# Patient Record
Sex: MALE | Race: AMERICAN INDIAN OR ALASKA NATIVE
[De-identification: names, ages, dates, MRNs, and addresses within clinical notes are randomized per-mention and may not be internally consistent; named-entity substitution may affect disease eponyms.]

---

## 2021-08-08 ENCOUNTER — HOSPITAL ENCOUNTER (EMERGENCY)
Age: 41
Discharge: HOME | End: 2021-08-08
Payer: COMMERCIAL

## 2021-08-08 VITALS
RESPIRATION RATE: 24 BRPM | OXYGEN SATURATION: 97 % | HEART RATE: 76 BPM | SYSTOLIC BLOOD PRESSURE: 177 MMHG | DIASTOLIC BLOOD PRESSURE: 118 MMHG

## 2021-08-08 VITALS
SYSTOLIC BLOOD PRESSURE: 178 MMHG | OXYGEN SATURATION: 98 % | RESPIRATION RATE: 24 BRPM | DIASTOLIC BLOOD PRESSURE: 111 MMHG | HEART RATE: 71 BPM

## 2021-08-08 VITALS
HEART RATE: 81 BPM | TEMPERATURE: 98.78 F | OXYGEN SATURATION: 99 % | DIASTOLIC BLOOD PRESSURE: 126 MMHG | RESPIRATION RATE: 18 BRPM | SYSTOLIC BLOOD PRESSURE: 194 MMHG

## 2021-08-08 VITALS
SYSTOLIC BLOOD PRESSURE: 179 MMHG | DIASTOLIC BLOOD PRESSURE: 120 MMHG | OXYGEN SATURATION: 98 % | HEART RATE: 75 BPM | RESPIRATION RATE: 24 BRPM

## 2021-08-08 VITALS
OXYGEN SATURATION: 95 % | DIASTOLIC BLOOD PRESSURE: 123 MMHG | SYSTOLIC BLOOD PRESSURE: 185 MMHG | HEART RATE: 77 BPM | RESPIRATION RATE: 23 BRPM

## 2021-08-08 VITALS
SYSTOLIC BLOOD PRESSURE: 172 MMHG | RESPIRATION RATE: 20 BRPM | DIASTOLIC BLOOD PRESSURE: 120 MMHG | HEART RATE: 77 BPM | OXYGEN SATURATION: 97 %

## 2021-08-08 VITALS — OXYGEN SATURATION: 97 % | HEART RATE: 74 BPM | RESPIRATION RATE: 18 BRPM

## 2021-08-08 VITALS — BODY MASS INDEX: 26.9 KG/M2

## 2021-08-08 DIAGNOSIS — I10: Primary | ICD-10-CM

## 2021-08-08 LAB
ADD MANUAL DIFF / SLIDE REVIEW: NO
ALBUMIN SERPL-MCNC: 4.1 G/DL (ref 3.5–5)
ALBUMIN/GLOB SERPL: 1.5 {RATIO} (ref 1–2.8)
ALP SERPL-CCNC: 62 U/L (ref 38–126)
ALT SERPL-CCNC: 15 IU/L (ref ?–50)
BUN SERPL-MCNC: 17 MG/DL (ref 9–20)
CALCIUM SERPL-MCNC: 9.7 MG/DL (ref 8.4–10.2)
CHLORIDE SERPL-SCNC: 109 MMOL/L (ref 98–107)
CK SERPL-CCNC: 152 U/L (ref 55–170)
CKMB % RELATIVE INDEX: 1 % (ref 1.5–5)
CO2 SERPL-SCNC: 24 MMOL/L (ref 22–32)
ESTIMATED GLOMERULAR FILT RATE: > 60 ML/MIN (ref 60–?)
GLOBULIN SER CALC-MCNC: 2.7 G/DL (ref 1.7–4.1)
GLUCOSE SERPL-MCNC: 118 MG/DL (ref 70–100)
HEMATOCRIT: 39.1 % (ref 41–53)
HEMOGLOBIN: 13.7 G/DL (ref 13.5–17.5)
HEMOLYSIS: < 15 (ref 0–50)
LIPASE SERPL-CCNC: 65 U/L (ref 23–300)
LYMPHOCYTES # SPEC AUTO: 1400 /UL (ref 1100–4500)
MCV RBC: 87 FL (ref 80–100)
MEAN CORPUSCULAR HEMOGLOBIN: 30.5 PG (ref 26–34)
MEAN CORPUSCULAR HGB CONC: 35.1 % (ref 30–36)
PLATELET COUNT: 242 X10^3/UL (ref 150–400)
POTASSIUM SERPL-SCNC: 3.7 MMOL/L (ref 3.4–5.1)
PROT SERPL-MCNC: 6.8 G/DL (ref 6.3–8.2)
SODIUM SERPL-SCNC: 140 MMOL/L (ref 137–145)
TROPONIN I SERPL-MCNC: < 0.012 NG/ML (ref 0.01–0.03)
TROPONIN I SERPL-MCNC: < 0.012 NG/ML (ref 0.01–0.03)

## 2021-08-08 PROCEDURE — 80053 COMPREHEN METABOLIC PANEL: CPT

## 2021-08-08 PROCEDURE — 36415 COLL VENOUS BLD VENIPUNCTURE: CPT

## 2021-08-08 PROCEDURE — 99284 EMERGENCY DEPT VISIT MOD MDM: CPT

## 2021-08-08 PROCEDURE — 85025 COMPLETE CBC W/AUTO DIFF WBC: CPT

## 2021-08-08 PROCEDURE — 71045 X-RAY EXAM CHEST 1 VIEW: CPT

## 2021-08-08 PROCEDURE — 93005 ELECTROCARDIOGRAM TRACING: CPT

## 2021-08-08 PROCEDURE — 82553 CREATINE MB FRACTION: CPT

## 2021-08-08 PROCEDURE — 83690 ASSAY OF LIPASE: CPT

## 2021-08-08 PROCEDURE — 84484 ASSAY OF TROPONIN QUANT: CPT

## 2021-08-08 PROCEDURE — 82550 ASSAY OF CK (CPK): CPT

## 2021-08-23 ENCOUNTER — HOSPITAL ENCOUNTER (OUTPATIENT)
Age: 41
End: 2021-08-23
Payer: COMMERCIAL

## 2021-08-23 DIAGNOSIS — I35.1: Primary | ICD-10-CM

## 2021-08-23 DIAGNOSIS — I10: ICD-10-CM

## 2021-08-23 DIAGNOSIS — Z82.49: ICD-10-CM

## 2021-08-23 DIAGNOSIS — Z13.5: ICD-10-CM

## 2021-08-23 DIAGNOSIS — R51.9: ICD-10-CM

## 2021-08-23 DIAGNOSIS — I77.810: ICD-10-CM

## 2021-08-23 PROCEDURE — 70200 X-RAY EXAM OF EYE SOCKETS: CPT

## 2021-08-23 PROCEDURE — 70548 MR ANGIOGRAPHY NECK W/DYE: CPT

## 2021-08-23 PROCEDURE — 70544 MR ANGIOGRAPHY HEAD W/O DYE: CPT

## 2021-08-23 PROCEDURE — 93306 TTE W/DOPPLER COMPLETE: CPT

## 2021-08-23 PROCEDURE — A9579 GAD-BASE MR CONTRAST NOS,1ML: HCPCS

## 2021-08-28 ENCOUNTER — HOSPITAL ENCOUNTER (OUTPATIENT)
Dept: HOSPITAL 73 - ED | Age: 41
Setting detail: OBSERVATION
LOS: 1 days | Discharge: HOME | End: 2021-08-29
Payer: COMMERCIAL

## 2021-08-28 VITALS — BODY MASS INDEX: 24.3 KG/M2 | BODY MASS INDEX: 24.2 KG/M2

## 2021-08-28 DIAGNOSIS — Q25.43: ICD-10-CM

## 2021-08-28 DIAGNOSIS — R07.9: Primary | ICD-10-CM

## 2021-08-28 DIAGNOSIS — Z20.822: ICD-10-CM

## 2021-08-28 DIAGNOSIS — I10: ICD-10-CM

## 2021-08-28 PROCEDURE — 80061 LIPID PANEL: CPT

## 2021-08-28 PROCEDURE — 83735 ASSAY OF MAGNESIUM: CPT

## 2021-08-28 PROCEDURE — 87635 SARS-COV-2 COVID-19 AMP PRB: CPT

## 2021-08-28 PROCEDURE — 83880 ASSAY OF NATRIURETIC PEPTIDE: CPT

## 2021-08-28 PROCEDURE — 82553 CREATINE MB FRACTION: CPT

## 2021-08-28 PROCEDURE — 96374 THER/PROPH/DIAG INJ IV PUSH: CPT

## 2021-08-28 PROCEDURE — 74174 CTA ABD&PLVS W/CONTRAST: CPT

## 2021-08-28 PROCEDURE — 85610 PROTHROMBIN TIME: CPT

## 2021-08-28 PROCEDURE — 93005 ELECTROCARDIOGRAM TRACING: CPT

## 2021-08-28 PROCEDURE — 85025 COMPLETE CBC W/AUTO DIFF WBC: CPT

## 2021-08-28 PROCEDURE — G0378 HOSPITAL OBSERVATION PER HR: HCPCS

## 2021-08-28 PROCEDURE — 99285 EMERGENCY DEPT VISIT HI MDM: CPT

## 2021-08-28 PROCEDURE — 82550 ASSAY OF CK (CPK): CPT

## 2021-08-28 PROCEDURE — 71045 X-RAY EXAM CHEST 1 VIEW: CPT

## 2021-08-28 PROCEDURE — 83690 ASSAY OF LIPASE: CPT

## 2021-08-28 PROCEDURE — 85730 THROMBOPLASTIN TIME PARTIAL: CPT

## 2021-08-28 PROCEDURE — 80053 COMPREHEN METABOLIC PANEL: CPT

## 2021-08-28 PROCEDURE — 36415 COLL VENOUS BLD VENIPUNCTURE: CPT

## 2021-08-28 PROCEDURE — 96361 HYDRATE IV INFUSION ADD-ON: CPT

## 2021-08-28 PROCEDURE — 84484 ASSAY OF TROPONIN QUANT: CPT

## 2021-08-28 PROCEDURE — 71275 CT ANGIOGRAPHY CHEST: CPT

## 2021-08-29 VITALS
SYSTOLIC BLOOD PRESSURE: 131 MMHG | DIASTOLIC BLOOD PRESSURE: 87 MMHG | HEART RATE: 65 BPM | RESPIRATION RATE: 16 BRPM | OXYGEN SATURATION: 97 %

## 2021-08-29 VITALS
OXYGEN SATURATION: 98 % | HEART RATE: 68 BPM | SYSTOLIC BLOOD PRESSURE: 143 MMHG | DIASTOLIC BLOOD PRESSURE: 88 MMHG | RESPIRATION RATE: 14 BRPM

## 2021-08-29 VITALS
RESPIRATION RATE: 16 BRPM | HEART RATE: 62 BPM | OXYGEN SATURATION: 97 % | SYSTOLIC BLOOD PRESSURE: 141 MMHG | DIASTOLIC BLOOD PRESSURE: 92 MMHG | TEMPERATURE: 96.98 F

## 2021-08-29 VITALS
HEART RATE: 59 BPM | RESPIRATION RATE: 14 BRPM | DIASTOLIC BLOOD PRESSURE: 86 MMHG | OXYGEN SATURATION: 98 % | SYSTOLIC BLOOD PRESSURE: 129 MMHG

## 2021-08-29 VITALS
SYSTOLIC BLOOD PRESSURE: 148 MMHG | DIASTOLIC BLOOD PRESSURE: 89 MMHG | HEART RATE: 74 BPM | RESPIRATION RATE: 16 BRPM | OXYGEN SATURATION: 97 %

## 2021-08-29 VITALS
TEMPERATURE: 98.06 F | HEART RATE: 63 BPM | DIASTOLIC BLOOD PRESSURE: 85 MMHG | SYSTOLIC BLOOD PRESSURE: 140 MMHG | OXYGEN SATURATION: 100 % | RESPIRATION RATE: 18 BRPM

## 2021-08-29 VITALS — OXYGEN SATURATION: 97 %

## 2021-08-29 VITALS
HEART RATE: 64 BPM | DIASTOLIC BLOOD PRESSURE: 85 MMHG | OXYGEN SATURATION: 98 % | SYSTOLIC BLOOD PRESSURE: 139 MMHG | RESPIRATION RATE: 14 BRPM

## 2021-08-29 VITALS
HEART RATE: 65 BPM | DIASTOLIC BLOOD PRESSURE: 85 MMHG | OXYGEN SATURATION: 97 % | SYSTOLIC BLOOD PRESSURE: 131 MMHG | RESPIRATION RATE: 16 BRPM

## 2021-08-29 VITALS — HEART RATE: 66 BPM | DIASTOLIC BLOOD PRESSURE: 103 MMHG | SYSTOLIC BLOOD PRESSURE: 150 MMHG

## 2021-08-29 VITALS
RESPIRATION RATE: 16 BRPM | SYSTOLIC BLOOD PRESSURE: 138 MMHG | HEART RATE: 63 BPM | DIASTOLIC BLOOD PRESSURE: 90 MMHG | OXYGEN SATURATION: 97 %

## 2021-08-29 VITALS
DIASTOLIC BLOOD PRESSURE: 102 MMHG | RESPIRATION RATE: 16 BRPM | HEART RATE: 68 BPM | OXYGEN SATURATION: 99 % | TEMPERATURE: 96.98 F | SYSTOLIC BLOOD PRESSURE: 162 MMHG

## 2021-08-29 VITALS — SYSTOLIC BLOOD PRESSURE: 130 MMHG | DIASTOLIC BLOOD PRESSURE: 85 MMHG | HEART RATE: 65 BPM

## 2021-08-29 VITALS
OXYGEN SATURATION: 96 % | HEART RATE: 66 BPM | DIASTOLIC BLOOD PRESSURE: 80 MMHG | SYSTOLIC BLOOD PRESSURE: 130 MMHG | RESPIRATION RATE: 17 BRPM

## 2021-08-29 VITALS — SYSTOLIC BLOOD PRESSURE: 124 MMHG | DIASTOLIC BLOOD PRESSURE: 82 MMHG | HEART RATE: 64 BPM

## 2021-08-29 VITALS
HEART RATE: 64 BPM | SYSTOLIC BLOOD PRESSURE: 122 MMHG | OXYGEN SATURATION: 98 % | DIASTOLIC BLOOD PRESSURE: 80 MMHG | RESPIRATION RATE: 15 BRPM

## 2021-08-29 VITALS — SYSTOLIC BLOOD PRESSURE: 150 MMHG | HEART RATE: 66 BPM | DIASTOLIC BLOOD PRESSURE: 104 MMHG | OXYGEN SATURATION: 98 %

## 2021-08-29 VITALS
SYSTOLIC BLOOD PRESSURE: 140 MMHG | DIASTOLIC BLOOD PRESSURE: 92 MMHG | HEART RATE: 65 BPM | RESPIRATION RATE: 18 BRPM | OXYGEN SATURATION: 98 %

## 2021-08-29 VITALS
OXYGEN SATURATION: 97 % | RESPIRATION RATE: 16 BRPM | SYSTOLIC BLOOD PRESSURE: 130 MMHG | DIASTOLIC BLOOD PRESSURE: 86 MMHG | HEART RATE: 62 BPM

## 2021-08-29 VITALS
DIASTOLIC BLOOD PRESSURE: 85 MMHG | SYSTOLIC BLOOD PRESSURE: 132 MMHG | RESPIRATION RATE: 16 BRPM | HEART RATE: 64 BPM | OXYGEN SATURATION: 98 %

## 2021-08-29 VITALS
DIASTOLIC BLOOD PRESSURE: 84 MMHG | HEART RATE: 67 BPM | OXYGEN SATURATION: 96 % | RESPIRATION RATE: 13 BRPM | SYSTOLIC BLOOD PRESSURE: 130 MMHG

## 2021-08-29 VITALS
RESPIRATION RATE: 14 BRPM | SYSTOLIC BLOOD PRESSURE: 118 MMHG | OXYGEN SATURATION: 96 % | HEART RATE: 61 BPM | DIASTOLIC BLOOD PRESSURE: 80 MMHG

## 2021-08-29 VITALS
DIASTOLIC BLOOD PRESSURE: 94 MMHG | SYSTOLIC BLOOD PRESSURE: 144 MMHG | RESPIRATION RATE: 16 BRPM | OXYGEN SATURATION: 98 % | HEART RATE: 64 BPM

## 2021-08-29 VITALS
DIASTOLIC BLOOD PRESSURE: 90 MMHG | OXYGEN SATURATION: 98 % | RESPIRATION RATE: 14 BRPM | HEART RATE: 67 BPM | SYSTOLIC BLOOD PRESSURE: 146 MMHG

## 2021-08-29 VITALS
HEART RATE: 62 BPM | SYSTOLIC BLOOD PRESSURE: 138 MMHG | OXYGEN SATURATION: 98 % | DIASTOLIC BLOOD PRESSURE: 89 MMHG | RESPIRATION RATE: 13 BRPM

## 2021-08-29 VITALS — OXYGEN SATURATION: 98 % | HEART RATE: 61 BPM

## 2021-08-29 VITALS
SYSTOLIC BLOOD PRESSURE: 161 MMHG | DIASTOLIC BLOOD PRESSURE: 90 MMHG | RESPIRATION RATE: 18 BRPM | OXYGEN SATURATION: 98 % | TEMPERATURE: 97 F | HEART RATE: 67 BPM

## 2021-08-29 VITALS
RESPIRATION RATE: 18 BRPM | SYSTOLIC BLOOD PRESSURE: 135 MMHG | OXYGEN SATURATION: 97 % | DIASTOLIC BLOOD PRESSURE: 84 MMHG | HEART RATE: 64 BPM

## 2021-08-29 VITALS
SYSTOLIC BLOOD PRESSURE: 129 MMHG | RESPIRATION RATE: 17 BRPM | DIASTOLIC BLOOD PRESSURE: 83 MMHG | OXYGEN SATURATION: 97 % | HEART RATE: 62 BPM

## 2021-08-29 VITALS
RESPIRATION RATE: 10 BRPM | DIASTOLIC BLOOD PRESSURE: 89 MMHG | OXYGEN SATURATION: 97 % | SYSTOLIC BLOOD PRESSURE: 130 MMHG | HEART RATE: 63 BPM

## 2021-08-29 VITALS
SYSTOLIC BLOOD PRESSURE: 147 MMHG | OXYGEN SATURATION: 96 % | DIASTOLIC BLOOD PRESSURE: 104 MMHG | RESPIRATION RATE: 22 BRPM | HEART RATE: 71 BPM

## 2021-08-29 VITALS — RESPIRATION RATE: 17 BRPM | TEMPERATURE: 97.2 F

## 2021-08-29 VITALS
SYSTOLIC BLOOD PRESSURE: 136 MMHG | RESPIRATION RATE: 11 BRPM | HEART RATE: 62 BPM | DIASTOLIC BLOOD PRESSURE: 89 MMHG | OXYGEN SATURATION: 97 %

## 2021-08-29 VITALS — RESPIRATION RATE: 24 BRPM | OXYGEN SATURATION: 97 % | HEART RATE: 71 BPM

## 2021-08-29 VITALS — DIASTOLIC BLOOD PRESSURE: 72 MMHG | HEART RATE: 71 BPM | SYSTOLIC BLOOD PRESSURE: 126 MMHG

## 2021-08-29 VITALS
RESPIRATION RATE: 17 BRPM | DIASTOLIC BLOOD PRESSURE: 90 MMHG | HEART RATE: 69 BPM | OXYGEN SATURATION: 98 % | SYSTOLIC BLOOD PRESSURE: 140 MMHG

## 2021-08-29 VITALS
OXYGEN SATURATION: 96 % | HEART RATE: 65 BPM | DIASTOLIC BLOOD PRESSURE: 85 MMHG | SYSTOLIC BLOOD PRESSURE: 128 MMHG | RESPIRATION RATE: 17 BRPM

## 2021-08-29 VITALS
HEART RATE: 63 BPM | DIASTOLIC BLOOD PRESSURE: 80 MMHG | OXYGEN SATURATION: 97 % | SYSTOLIC BLOOD PRESSURE: 124 MMHG | RESPIRATION RATE: 19 BRPM

## 2021-08-29 VITALS
DIASTOLIC BLOOD PRESSURE: 86 MMHG | OXYGEN SATURATION: 97 % | RESPIRATION RATE: 19 BRPM | SYSTOLIC BLOOD PRESSURE: 132 MMHG | HEART RATE: 63 BPM

## 2021-08-29 VITALS
SYSTOLIC BLOOD PRESSURE: 145 MMHG | DIASTOLIC BLOOD PRESSURE: 95 MMHG | RESPIRATION RATE: 15 BRPM | HEART RATE: 72 BPM | OXYGEN SATURATION: 94 %

## 2021-08-29 VITALS
SYSTOLIC BLOOD PRESSURE: 130 MMHG | DIASTOLIC BLOOD PRESSURE: 88 MMHG | OXYGEN SATURATION: 98 % | HEART RATE: 61 BPM | RESPIRATION RATE: 15 BRPM

## 2021-08-29 VITALS — HEART RATE: 67 BPM | DIASTOLIC BLOOD PRESSURE: 100 MMHG | SYSTOLIC BLOOD PRESSURE: 146 MMHG

## 2021-08-29 LAB
ADD MANUAL DIFF / SLIDE REVIEW: NO
ALBUMIN SERPL-MCNC: 4.2 G/DL (ref 3.5–5)
ALBUMIN/GLOB SERPL: 1.4 {RATIO} (ref 1–2.8)
ALP SERPL-CCNC: 67 U/L (ref 38–126)
ALT SERPL-CCNC: 18 IU/L (ref ?–50)
BUN SERPL-MCNC: 24 MG/DL (ref 9–20)
CALCIUM SERPL-MCNC: 9.3 MG/DL (ref 8.4–10.2)
CHLORIDE SERPL-SCNC: 105 MMOL/L (ref 98–107)
CHOLEST SERPL-MCNC: 146 MG/DL (ref 140–199)
CK SERPL-CCNC: 111 U/L (ref 55–170)
CK SERPL-CCNC: 139 U/L (ref 55–170)
CKMB % RELATIVE INDEX: 0.8 % (ref 1.5–5)
CKMB % RELATIVE INDEX: 0.8 % (ref 1.5–5)
CO2 SERPL-SCNC: 24 MMOL/L (ref 22–32)
ESTIMATED GLOMERULAR FILT RATE: > 60 ML/MIN (ref 60–?)
GLOBULIN SER CALC-MCNC: 2.9 G/DL (ref 1.7–4.1)
GLUCOSE SERPL-MCNC: 81 MG/DL (ref 70–100)
HDLC SERPL-MCNC: 47 MG/DL (ref 40–60)
HEMATOCRIT: 42.3 % (ref 41–53)
HEMOGLOBIN: 14.2 G/DL (ref 13.5–17.5)
HEMOLYSIS: < 15 (ref 0–50)
INR PPP: 1.1 (ref 0.9–1.3)
LIPASE SERPL-CCNC: 77 U/L (ref 23–300)
LYMPHOCYTES # SPEC AUTO: 2900 /UL (ref 1100–4500)
MAGNESIUM SERPL-MCNC: 2.2 MG/DL (ref 1.6–2.3)
MCV RBC: 87 FL (ref 80–100)
MEAN CORPUSCULAR HEMOGLOBIN: 29.3 PG (ref 26–34)
MEAN CORPUSCULAR HGB CONC: 33.6 % (ref 30–36)
NT-PROBNP SERPL-MCNC: 53 PG/ML (ref ?–125)
PLATELET COUNT: 298 X10^3/UL (ref 150–400)
POTASSIUM SERPL-SCNC: 3.8 MMOL/L (ref 3.4–5.1)
PROT SERPL-MCNC: 7.1 G/DL (ref 6.3–8.2)
PROTHROMBIN TIME: 11.9 SECONDS (ref 10.1–12.7)
PTT PARTIAL THROMBOPLASTIN TIM: 35 SECONDS (ref 26.4–36.2)
SODIUM SERPL-SCNC: 138 MMOL/L (ref 137–145)
TRIGL SERPL-MCNC: 194 MG/DL (ref 35–150)
TROPONIN I SERPL-MCNC: < 0.012 NG/ML (ref 0.01–0.03)

## 2021-08-29 RX ADMIN — ASPIRIN 324 MG: 81 TABLET, CHEWABLE ORAL at 00:30

## 2021-08-29 RX ADMIN — NITROGLYCERIN 0.4 MG: 0.4 TABLET SUBLINGUAL at 00:30

## 2021-08-29 RX ADMIN — METOPROLOL TARTRATE 25 MG: 25 TABLET, FILM COATED ORAL at 12:01

## 2021-08-29 RX ADMIN — SODIUM CHLORIDE 150 ML: 0.9 INJECTION, SOLUTION INTRAVENOUS at 00:30

## 2021-08-29 RX ADMIN — METOPROLOL TARTRATE 5 MG: 5 INJECTION, SOLUTION INTRAVENOUS at 14:02

## 2021-08-29 RX ADMIN — METOPROLOL SUCCINATE 25 MG: 25 TABLET, EXTENDED RELEASE ORAL at 08:04

## 2021-08-29 RX ADMIN — NITROGLYCERIN 0.4 MG: 0.4 TABLET SUBLINGUAL at 00:37

## 2021-08-29 RX ADMIN — LOSARTAN POTASSIUM 100 MG: 50 TABLET, FILM COATED ORAL at 08:04

## 2021-08-29 RX ADMIN — Medication 10 ML: at 12:02

## 2021-09-25 ENCOUNTER — HOSPITAL ENCOUNTER (EMERGENCY)
Age: 41
Discharge: HOME | End: 2021-09-25
Payer: COMMERCIAL

## 2021-09-25 VITALS — RESPIRATION RATE: 21 BRPM | HEART RATE: 76 BPM | DIASTOLIC BLOOD PRESSURE: 68 MMHG | SYSTOLIC BLOOD PRESSURE: 103 MMHG

## 2021-09-25 VITALS
DIASTOLIC BLOOD PRESSURE: 84 MMHG | SYSTOLIC BLOOD PRESSURE: 135 MMHG | RESPIRATION RATE: 15 BRPM | OXYGEN SATURATION: 97 % | HEART RATE: 93 BPM

## 2021-09-25 VITALS — HEART RATE: 75 BPM | RESPIRATION RATE: 19 BRPM

## 2021-09-25 VITALS
HEART RATE: 85 BPM | DIASTOLIC BLOOD PRESSURE: 84 MMHG | TEMPERATURE: 98.42 F | RESPIRATION RATE: 22 BRPM | OXYGEN SATURATION: 100 % | SYSTOLIC BLOOD PRESSURE: 135 MMHG

## 2021-09-25 VITALS
DIASTOLIC BLOOD PRESSURE: 59 MMHG | SYSTOLIC BLOOD PRESSURE: 113 MMHG | OXYGEN SATURATION: 96 % | HEART RATE: 86 BPM | RESPIRATION RATE: 22 BRPM

## 2021-09-25 VITALS — HEART RATE: 92 BPM | RESPIRATION RATE: 19 BRPM

## 2021-09-25 VITALS — RESPIRATION RATE: 23 BRPM | HEART RATE: 82 BPM | OXYGEN SATURATION: 97 %

## 2021-09-25 VITALS
RESPIRATION RATE: 25 BRPM | HEART RATE: 91 BPM | DIASTOLIC BLOOD PRESSURE: 80 MMHG | OXYGEN SATURATION: 98 % | SYSTOLIC BLOOD PRESSURE: 146 MMHG

## 2021-09-25 VITALS — OXYGEN SATURATION: 97 % | RESPIRATION RATE: 22 BRPM | HEART RATE: 80 BPM

## 2021-09-25 VITALS — HEART RATE: 76 BPM | RESPIRATION RATE: 19 BRPM | DIASTOLIC BLOOD PRESSURE: 56 MMHG | SYSTOLIC BLOOD PRESSURE: 95 MMHG

## 2021-09-25 VITALS — HEART RATE: 78 BPM | DIASTOLIC BLOOD PRESSURE: 59 MMHG | RESPIRATION RATE: 22 BRPM | SYSTOLIC BLOOD PRESSURE: 104 MMHG

## 2021-09-25 VITALS
RESPIRATION RATE: 26 BRPM | DIASTOLIC BLOOD PRESSURE: 84 MMHG | SYSTOLIC BLOOD PRESSURE: 138 MMHG | HEART RATE: 92 BPM | OXYGEN SATURATION: 97 %

## 2021-09-25 VITALS — RESPIRATION RATE: 22 BRPM | HEART RATE: 79 BPM | DIASTOLIC BLOOD PRESSURE: 62 MMHG | SYSTOLIC BLOOD PRESSURE: 104 MMHG

## 2021-09-25 VITALS
OXYGEN SATURATION: 97 % | SYSTOLIC BLOOD PRESSURE: 115 MMHG | DIASTOLIC BLOOD PRESSURE: 61 MMHG | RESPIRATION RATE: 24 BRPM | HEART RATE: 81 BPM

## 2021-09-25 VITALS — RESPIRATION RATE: 23 BRPM | HEART RATE: 79 BPM | OXYGEN SATURATION: 96 %

## 2021-09-25 VITALS
HEART RATE: 89 BPM | OXYGEN SATURATION: 97 % | SYSTOLIC BLOOD PRESSURE: 156 MMHG | RESPIRATION RATE: 24 BRPM | DIASTOLIC BLOOD PRESSURE: 64 MMHG

## 2021-09-25 VITALS — HEART RATE: 85 BPM | RESPIRATION RATE: 33 BRPM | OXYGEN SATURATION: 97 %

## 2021-09-25 VITALS — RESPIRATION RATE: 19 BRPM | HEART RATE: 76 BPM | DIASTOLIC BLOOD PRESSURE: 54 MMHG | SYSTOLIC BLOOD PRESSURE: 88 MMHG

## 2021-09-25 VITALS
DIASTOLIC BLOOD PRESSURE: 55 MMHG | SYSTOLIC BLOOD PRESSURE: 108 MMHG | RESPIRATION RATE: 24 BRPM | HEART RATE: 81 BPM | OXYGEN SATURATION: 96 %

## 2021-09-25 VITALS — DIASTOLIC BLOOD PRESSURE: 62 MMHG | SYSTOLIC BLOOD PRESSURE: 104 MMHG | RESPIRATION RATE: 22 BRPM | HEART RATE: 80 BPM

## 2021-09-25 VITALS — RESPIRATION RATE: 26 BRPM | HEART RATE: 93 BPM | OXYGEN SATURATION: 97 %

## 2021-09-25 VITALS — RESPIRATION RATE: 24 BRPM | DIASTOLIC BLOOD PRESSURE: 56 MMHG | SYSTOLIC BLOOD PRESSURE: 103 MMHG | HEART RATE: 77 BPM

## 2021-09-25 VITALS — RESPIRATION RATE: 22 BRPM | HEART RATE: 81 BPM | OXYGEN SATURATION: 96 %

## 2021-09-25 VITALS — OXYGEN SATURATION: 97 %

## 2021-09-25 VITALS
OXYGEN SATURATION: 97 % | HEART RATE: 81 BPM | DIASTOLIC BLOOD PRESSURE: 59 MMHG | RESPIRATION RATE: 22 BRPM | SYSTOLIC BLOOD PRESSURE: 111 MMHG

## 2021-09-25 VITALS — OXYGEN SATURATION: 97 % | RESPIRATION RATE: 30 BRPM | HEART RATE: 85 BPM

## 2021-09-25 VITALS — OXYGEN SATURATION: 97 % | HEART RATE: 96 BPM

## 2021-09-25 VITALS — OXYGEN SATURATION: 97 % | RESPIRATION RATE: 25 BRPM | HEART RATE: 87 BPM

## 2021-09-25 VITALS
DIASTOLIC BLOOD PRESSURE: 62 MMHG | SYSTOLIC BLOOD PRESSURE: 126 MMHG | OXYGEN SATURATION: 97 % | HEART RATE: 85 BPM | RESPIRATION RATE: 27 BRPM

## 2021-09-25 VITALS — RESPIRATION RATE: 26 BRPM | HEART RATE: 84 BPM | OXYGEN SATURATION: 100 %

## 2021-09-25 VITALS — BODY MASS INDEX: 24.2 KG/M2 | BODY MASS INDEX: 53.5 KG/M2

## 2021-09-25 DIAGNOSIS — R06.00: ICD-10-CM

## 2021-09-25 DIAGNOSIS — Z20.822: ICD-10-CM

## 2021-09-25 DIAGNOSIS — R07.89: Primary | ICD-10-CM

## 2021-09-25 DIAGNOSIS — I77.89: ICD-10-CM

## 2021-09-25 LAB
ADD MANUAL DIFF / SLIDE REVIEW: NO
ALBUMIN SERPL-MCNC: 4.4 G/DL (ref 3.5–5)
ALBUMIN/GLOB SERPL: 1.6 {RATIO} (ref 1–2.8)
ALP SERPL-CCNC: 63 U/L (ref 38–126)
ALT SERPL-CCNC: 20 IU/L (ref ?–50)
BUN SERPL-MCNC: 19 MG/DL (ref 9–20)
CALCIUM SERPL-MCNC: 9.4 MG/DL (ref 8.4–10.2)
CHLORIDE SERPL-SCNC: 101 MMOL/L (ref 98–107)
CO2 SERPL-SCNC: 28 MMOL/L (ref 22–32)
ESTIMATED GLOMERULAR FILT RATE: > 60 ML/MIN (ref 60–?)
GLOBULIN SER CALC-MCNC: 2.8 G/DL (ref 1.7–4.1)
GLUCOSE SERPL-MCNC: 108 MG/DL (ref 70–100)
HEMATOCRIT: 41.1 % (ref 41–53)
HEMOGLOBIN: 13.8 G/DL (ref 13.5–17.5)
HEMOLYSIS: < 15 (ref 0–50)
LIPASE SERPL-CCNC: 55 U/L (ref 23–300)
LYMPHOCYTES # SPEC AUTO: 2100 /UL (ref 1100–4500)
MAGNESIUM SERPL-MCNC: 2 MG/DL (ref 1.6–2.3)
MCV RBC: 86.7 FL (ref 80–100)
MEAN CORPUSCULAR HEMOGLOBIN: 29.2 PG (ref 26–34)
MEAN CORPUSCULAR HGB CONC: 33.6 % (ref 30–36)
PLATELET COUNT: 282 X10^3/UL (ref 150–400)
POTASSIUM SERPL-SCNC: 3.5 MMOL/L (ref 3.4–5.1)
PROT SERPL-MCNC: 7.2 G/DL (ref 6.3–8.2)
SODIUM SERPL-SCNC: 137 MMOL/L (ref 137–145)
TROPONIN I SERPL-MCNC: < 0.012 NG/ML (ref 0.01–0.03)

## 2021-09-25 PROCEDURE — 87635 SARS-COV-2 COVID-19 AMP PRB: CPT

## 2021-09-25 PROCEDURE — 74174 CTA ABD&PLVS W/CONTRAST: CPT

## 2021-09-25 PROCEDURE — 83735 ASSAY OF MAGNESIUM: CPT

## 2021-09-25 PROCEDURE — 83690 ASSAY OF LIPASE: CPT

## 2021-09-25 PROCEDURE — 36415 COLL VENOUS BLD VENIPUNCTURE: CPT

## 2021-09-25 PROCEDURE — 86900 BLOOD TYPING SEROLOGIC ABO: CPT

## 2021-09-25 PROCEDURE — 99285 EMERGENCY DEPT VISIT HI MDM: CPT

## 2021-09-25 PROCEDURE — 96374 THER/PROPH/DIAG INJ IV PUSH: CPT

## 2021-09-25 PROCEDURE — 96375 TX/PRO/DX INJ NEW DRUG ADDON: CPT

## 2021-09-25 PROCEDURE — 80053 COMPREHEN METABOLIC PANEL: CPT

## 2021-09-25 PROCEDURE — 86850 RBC ANTIBODY SCREEN: CPT

## 2021-09-25 PROCEDURE — 85025 COMPLETE CBC W/AUTO DIFF WBC: CPT

## 2021-09-25 PROCEDURE — 84484 ASSAY OF TROPONIN QUANT: CPT

## 2021-09-25 PROCEDURE — 93005 ELECTROCARDIOGRAM TRACING: CPT

## 2021-09-25 PROCEDURE — 86901 BLOOD TYPING SEROLOGIC RH(D): CPT

## 2021-09-25 PROCEDURE — 96376 TX/PRO/DX INJ SAME DRUG ADON: CPT

## 2021-09-25 PROCEDURE — 71275 CT ANGIOGRAPHY CHEST: CPT

## 2023-10-21 ENCOUNTER — HOSPITAL ENCOUNTER (OUTPATIENT)
Age: 43
End: 2023-10-21
Payer: COMMERCIAL

## 2023-10-21 VITALS — BODY MASS INDEX: 24.2 KG/M2

## 2023-10-21 DIAGNOSIS — F41.8: Primary | ICD-10-CM

## 2023-10-21 DIAGNOSIS — I77.89: ICD-10-CM

## 2023-10-21 DIAGNOSIS — I10: ICD-10-CM

## 2023-10-21 LAB
ADD MANUAL DIFF / SLIDE REVIEW: NO
ALBUMIN SERPL-MCNC: 4.4 G/DL (ref 3.5–5)
ALBUMIN/GLOB SERPL: 1.4 {RATIO} (ref 1–2.8)
ALP SERPL-CCNC: 73 U/L (ref 38–126)
ALT SERPL-CCNC: 22 IU/L (ref ?–50)
BUN SERPL-MCNC: 18 MG/DL (ref 9–20)
CALCIUM SERPL-MCNC: 9.5 MG/DL (ref 8.4–10.2)
CHLORIDE SERPL-SCNC: 107 MMOL/L (ref 98–107)
CHOLEST SERPL-MCNC: 254 MG/DL (ref 140–199)
CO2 SERPL-SCNC: 24 MMOL/L (ref 22–32)
ESTIMATED GLOMERULAR FILT RATE: > 60 ML/MIN (ref 60–?)
GLOBULIN SER CALC-MCNC: 3.2 G/DL (ref 1.7–4.1)
GLUCOSE SERPL-MCNC: 110 MG/DL (ref 70–100)
HDLC SERPL-MCNC: 59 MG/DL (ref 40–60)
HEMATOCRIT: 42.5 % (ref 41–53)
HEMOGLOBIN: 14.5 G/DL (ref 13.5–17.5)
HEMOLYSIS: 16 (ref 0–50)
LYMPHOCYTES # SPEC AUTO: 1700 /UL (ref 1100–4500)
MCV RBC: 86.9 FL (ref 80–100)
MEAN CORPUSCULAR HEMOGLOBIN: 29.7 PG (ref 26–34)
MEAN CORPUSCULAR HGB CONC: 34.2 % (ref 30–36)
MICROALBUMI CREATININ RATIO UR: 4.6 UG/MG CR (ref ?–30)
PLATELET COUNT: 321 X10^3/UL (ref 150–400)
POTASSIUM SERPL-SCNC: 4.6 MMOL/L (ref 3.4–5.1)
PROT SERPL-MCNC: 7.6 G/DL (ref 6.3–8.2)
SODIUM SERPL-SCNC: 139 MMOL/L (ref 137–145)
TRIGL SERPL-MCNC: 225 MG/DL (ref 35–150)
TSH W/ REFLEX TO FT4: 2.02 UIU/ML (ref 0.47–4.68)

## 2023-10-21 PROCEDURE — 82043 UR ALBUMIN QUANTITATIVE: CPT

## 2023-10-21 PROCEDURE — 80053 COMPREHEN METABOLIC PANEL: CPT

## 2023-10-21 PROCEDURE — 82570 ASSAY OF URINE CREATININE: CPT

## 2023-10-21 PROCEDURE — 36415 COLL VENOUS BLD VENIPUNCTURE: CPT

## 2023-10-21 PROCEDURE — 80061 LIPID PANEL: CPT

## 2023-10-21 PROCEDURE — 84443 ASSAY THYROID STIM HORMONE: CPT

## 2023-10-21 PROCEDURE — 85025 COMPLETE CBC W/AUTO DIFF WBC: CPT

## 2025-03-31 ENCOUNTER — HOSPITAL ENCOUNTER (EMERGENCY)
Age: 45
Discharge: HOME | End: 2025-03-31
Payer: COMMERCIAL

## 2025-03-31 VITALS — OXYGEN SATURATION: 96 % | HEART RATE: 67 BPM | DIASTOLIC BLOOD PRESSURE: 85 MMHG | SYSTOLIC BLOOD PRESSURE: 129 MMHG

## 2025-03-31 VITALS — OXYGEN SATURATION: 96 % | SYSTOLIC BLOOD PRESSURE: 125 MMHG | DIASTOLIC BLOOD PRESSURE: 74 MMHG | HEART RATE: 56 BPM

## 2025-03-31 VITALS
RESPIRATION RATE: 11 BRPM | OXYGEN SATURATION: 99 % | DIASTOLIC BLOOD PRESSURE: 76 MMHG | SYSTOLIC BLOOD PRESSURE: 121 MMHG | HEART RATE: 58 BPM

## 2025-03-31 VITALS — OXYGEN SATURATION: 97 % | HEART RATE: 59 BPM

## 2025-03-31 VITALS
DIASTOLIC BLOOD PRESSURE: 82 MMHG | SYSTOLIC BLOOD PRESSURE: 134 MMHG | RESPIRATION RATE: 12 BRPM | HEART RATE: 61 BPM | OXYGEN SATURATION: 99 %

## 2025-03-31 VITALS
SYSTOLIC BLOOD PRESSURE: 125 MMHG | HEART RATE: 59 BPM | RESPIRATION RATE: 16 BRPM | OXYGEN SATURATION: 98 % | DIASTOLIC BLOOD PRESSURE: 77 MMHG

## 2025-03-31 VITALS
RESPIRATION RATE: 12 BRPM | SYSTOLIC BLOOD PRESSURE: 110 MMHG | DIASTOLIC BLOOD PRESSURE: 75 MMHG | OXYGEN SATURATION: 93 % | HEART RATE: 59 BPM

## 2025-03-31 VITALS — DIASTOLIC BLOOD PRESSURE: 80 MMHG | OXYGEN SATURATION: 100 % | HEART RATE: 60 BPM | SYSTOLIC BLOOD PRESSURE: 137 MMHG

## 2025-03-31 VITALS
RESPIRATION RATE: 21 BRPM | DIASTOLIC BLOOD PRESSURE: 83 MMHG | OXYGEN SATURATION: 98 % | SYSTOLIC BLOOD PRESSURE: 135 MMHG | HEART RATE: 61 BPM

## 2025-03-31 VITALS
HEART RATE: 62 BPM | DIASTOLIC BLOOD PRESSURE: 69 MMHG | OXYGEN SATURATION: 96 % | SYSTOLIC BLOOD PRESSURE: 121 MMHG | RESPIRATION RATE: 10 BRPM

## 2025-03-31 VITALS — HEART RATE: 75 BPM | DIASTOLIC BLOOD PRESSURE: 82 MMHG | OXYGEN SATURATION: 98 % | SYSTOLIC BLOOD PRESSURE: 141 MMHG

## 2025-03-31 VITALS — SYSTOLIC BLOOD PRESSURE: 133 MMHG | DIASTOLIC BLOOD PRESSURE: 91 MMHG | HEART RATE: 72 BPM | OXYGEN SATURATION: 98 %

## 2025-03-31 VITALS
TEMPERATURE: 97.16 F | HEART RATE: 60 BPM | SYSTOLIC BLOOD PRESSURE: 144 MMHG | RESPIRATION RATE: 16 BRPM | OXYGEN SATURATION: 99 % | DIASTOLIC BLOOD PRESSURE: 88 MMHG

## 2025-03-31 VITALS
DIASTOLIC BLOOD PRESSURE: 84 MMHG | SYSTOLIC BLOOD PRESSURE: 139 MMHG | RESPIRATION RATE: 19 BRPM | OXYGEN SATURATION: 98 % | HEART RATE: 74 BPM

## 2025-03-31 VITALS — DIASTOLIC BLOOD PRESSURE: 88 MMHG | SYSTOLIC BLOOD PRESSURE: 144 MMHG

## 2025-03-31 VITALS — BODY MASS INDEX: 23.7 KG/M2 | BODY MASS INDEX: 24.2 KG/M2

## 2025-03-31 DIAGNOSIS — I10: ICD-10-CM

## 2025-03-31 DIAGNOSIS — G08: Primary | ICD-10-CM

## 2025-03-31 DIAGNOSIS — R42: ICD-10-CM

## 2025-03-31 DIAGNOSIS — Z86.73: ICD-10-CM

## 2025-03-31 DIAGNOSIS — R29.700: ICD-10-CM

## 2025-03-31 LAB
ADD MANUAL DIFF / SLIDE REVIEW: NO
ALBUMIN SERPL-MCNC: 4.3 G/DL (ref 3.5–5)
ALBUMIN/GLOB SERPL: 1.5 {RATIO} (ref 1–2.8)
ALP SERPL-CCNC: 50 U/L (ref 38–126)
ALT SERPL-CCNC: 27 IU/L (ref ?–50)
APPEARANCE UR: (no result)
BILIRUBIN URINE UA: NEGATIVE
BUN SERPL-MCNC: 15 MG/DL (ref 9–20)
CALCIUM SERPL-MCNC: 9.7 MG/DL (ref 8.4–10.2)
CHLORIDE SERPL-SCNC: 106 MMOL/L (ref 98–107)
CK SERPL-CCNC: 113 U/L (ref 55–170)
CO2 SERPL-SCNC: 21 MMOL/L (ref 22–32)
COLOR UR: YELLOW
ESTIMATED GLOMERULAR FILT RATE: > 60 ML/MIN (ref 60–?)
FLUAV RNA UPPER RESP QL NAA+PROBE: (no result)
FLUBV RNA UPPER RESP QL NAA+PROBE: (no result)
GLOBULIN SER CALC-MCNC: 2.9 G/DL (ref 1.7–4.1)
GLUCOSE SERPL-MCNC: 145 MG/DL (ref 70–100)
GLUCOSE URINE UA: NEGATIVE G/DL
HEMATOCRIT: 40 % (ref 41–53)
HEMOGLOBIN: 13.5 G/DL (ref 13.5–17.5)
HEMOLYSIS: < 15 (ref 0–50)
HGB UR QL: NEGATIVE
INR PPP: 1 (ref 0.9–1.3)
KETONES URINE UA: NEGATIVE
LEUKOCYTE ESTERASE URINE UA: NEGATIVE
LIPASE SERPL-CCNC: 37 U/L (ref 23–300)
LYMPHOCYTES # SPEC AUTO: 900 /UL (ref 1100–4500)
MCV RBC: 87 FL (ref 80–100)
MEAN CORPUSCULAR HEMOGLOBIN: 29.4 PG (ref 26–34)
MEAN CORPUSCULAR HGB CONC: 33.8 % (ref 30–36)
NITRITE URINE UA: NEGATIVE
NT-PROBNP SERPL-MCNC: 224 PG/ML (ref ?–125)
PH UR: 8 [PH] (ref 4.5–8)
PLATELET COUNT: 251 X10^3/UL (ref 150–400)
POTASSIUM SERPL-SCNC: 4.2 MMOL/L (ref 3.4–5.1)
PROCALCITONIN SERPL-MCNC: 0.04 NG/ML (ref ?–0.5)
PROT SERPL-MCNC: 7.2 G/DL (ref 6.3–8.2)
PROTEIN URINE UA: NEGATIVE
PROTHROMBIN TIME: 11.5 SECONDS (ref 9.4–12.5)
PTT PARTIAL THROMBOPLASTIN TIM: 34 SECONDS (ref 25.1–36.5)
SODIUM SERPL-SCNC: 136 MMOL/L (ref 137–145)
SP GR UR: 1.01 (ref 1–1.03)
TROPONIN I SERPL-MCNC: < 0.012 NG/ML (ref 0.01–0.03)
UROBILINOGEN UR QL: 0.2 E.U./DL

## 2025-03-31 PROCEDURE — 80053 COMPREHEN METABOLIC PANEL: CPT

## 2025-03-31 PROCEDURE — 70496 CT ANGIOGRAPHY HEAD: CPT

## 2025-03-31 PROCEDURE — 93005 ELECTROCARDIOGRAM TRACING: CPT

## 2025-03-31 PROCEDURE — 99284 EMERGENCY DEPT VISIT MOD MDM: CPT

## 2025-03-31 PROCEDURE — 83690 ASSAY OF LIPASE: CPT

## 2025-03-31 PROCEDURE — 85610 PROTHROMBIN TIME: CPT

## 2025-03-31 PROCEDURE — 83880 ASSAY OF NATRIURETIC PEPTIDE: CPT

## 2025-03-31 PROCEDURE — 84484 ASSAY OF TROPONIN QUANT: CPT

## 2025-03-31 PROCEDURE — 85730 THROMBOPLASTIN TIME PARTIAL: CPT

## 2025-03-31 PROCEDURE — 84145 PROCALCITONIN (PCT): CPT

## 2025-03-31 PROCEDURE — 71045 X-RAY EXAM CHEST 1 VIEW: CPT

## 2025-03-31 PROCEDURE — 85025 COMPLETE CBC W/AUTO DIFF WBC: CPT

## 2025-03-31 PROCEDURE — 99283 EMERGENCY DEPT VISIT LOW MDM: CPT

## 2025-03-31 PROCEDURE — 0241U: CPT

## 2025-03-31 PROCEDURE — 82550 ASSAY OF CK (CPK): CPT

## 2025-03-31 PROCEDURE — 80305 DRUG TEST PRSMV DIR OPT OBS: CPT

## 2025-03-31 PROCEDURE — 81001 URINALYSIS AUTO W/SCOPE: CPT

## 2025-03-31 PROCEDURE — 36415 COLL VENOUS BLD VENIPUNCTURE: CPT

## 2025-03-31 NOTE — ED_ITS
HPI - General Adult    
<Jamie York DO - Last Filed: 25 07:00>    
General    
Chief complaint: Chest Pain    
Stated complaint: infection in colon, confusion and hot flashes    
Time Seen by Provider: 25 04:53    
Source: patient    
Mode of arrival: Ambulatory    
History of Present Illness    
HPI narrative:     
44-year-old male past medical history of aortic root aneurysm status post   
hemiarch replacement at [facility] on 2022, stroke,   
hypertension, cerebral venous thrombosis, diverticulitis, comes into the ED from  
home for evaluation of multiple complaints.  He states that he is having random   
hot flashes as well as lightheadedness earlier this morning when he awoke.  He   
denies any actual syncopal symptoms.  Denies any actual chest pain.  Denies any   
shortness of breath fever chills nausea vomiting abdominal pain or any other   
GI/ symptoms at the time.  He states that he does feel somewhat similar to   
when he was seen here previously in which he was diagnosed with venous sinus   
thrombosis which required transfer to [facility].  On evaluation patient without  
any focal deficits NIH of 0.  He states that he is feeling the hot flashes and   
lightheadedness like he did before but states that he is not having the abnormal  
taste/smell that he was having previously.  He states that he also felt brain   
fog/confusion which has since resolved. He is also not having any actual   
abdominal pain that he had previously.    
Related Data    
                                Home Medications    
    
    
    
 Medication  Instructions  Recorded  Confirmed    
     
aspirin 325 mg tablet 325 mg PO DAILY 23    
    
    
                                  Previous Rx's    
    
    
    
 Medication  Instructions  Recorded    
     
sodium,potassium,mag sulfates 17.5 See Rx Instructions PO .COMPLEX 25    
    
gram-3.13 gram-1.6 gram oral soln #354 mL     
    
(Suprep Bowel Prep Kit)      
    
    
    
                                    Allergies    
    
    
    
Allergy/AdvReac Type Severity Reaction Status Date / Time    
     
No Known Drug Allergies Allergy   Verified 25 15:09    
    
    
    
    
Review of Systems    
<Jamie York DO - Last Filed: 25 07:00>    
Review of Systems    
Narrative:     
General:  Positive chills, hot flashes    
HEENT:  Denies headache, eye drainage, eye irritation, head trauma, sore throat,  
voice change    
Cardiovascular:  Denies any chest pain, palpitations, tachycardia    
Respiratory:  Denies any shortness of breath, cough, wheeze, stridor    
GI/:  Denies any abdominal pain, nausea, vomiting, diarrhea, bright red blood   
per rectum, melanotic stools, urinary frequency, urinary retention, dysuria,   
hematuria    
MSK:  Denies any joint pain, muscle pains, swelling    
Skin:  Denies any rashes, lesions, discoloration    
Neuro:  Positive lightheadedness, confusion/brain fog, denies headache weakness    
Psych:  Denies SI/HI    
    
Patient History    
<Jamie York DO - Last Filed: 25 07:00>    
Medical History (Updated 25 @ 06:59 by Jamie York DO)    
    
Colon cancer screening    
Essential hypertension    
Ascending aorta enlargement    
Aortic root enlargement    
Family history of brain aneurysm    
Hyperglycemia    
    
    
Surgical History (Reviewed 22 @ 08:14 by JONNY Farmer)    
    
No history of previous surgery    
    
    
Family History (Reviewed 22 @ 08:14 by JONNY Farmer)    
Mother   Hypertension    
Grandmother      Congestive heart failure    
   Stroke    
   Aortic dissection    
Father      Aortic aneurysm rupture    
   Aortic dissection    
Family/Other   Aortic dissection    
    
    
Social History (Updated 25 @ 15:38 by Sarah Miramontes MA)    
marital status:       
number of children:  3     
household members:  spouse and children     
lives independently:  Yes     
occupational status:  employed     
Smoking Status:  Former smoker     
alcohol intake:  former     
substance use type:  marijuana     
    
    
Smoking Status: Former smoker    
tobacco type: smokeless tobacco    
alcohol intake frequency: 0-2 drinks per day    
    
Exam    
<Jamie York DO - Last Filed: 25 07:00>    
Narrative    
Exam Narrative:     
General:  Cooperative, well-developed, not in acute distress    
HEENT:  Normocephalic, atraumatic, PERRLA, normal sclera, eyelids normal    
Neck:  Active full range of motion, atraumatic    
Chest:  Normal to inspection, negative crepitus, no overlying erythema   
ecchymosis    
Respiratory:  Normal respiratory effort, not in acute respiratory distress,   
clear to auscultation bilaterally negative cough, wheeze, tachypnea, rhonchi,   
rales    
Cardiology:  Regular rate rhythm negative gallop, murmur, rubs    
GI/:  No tenderness to palpation, soft, non rigid, normal to inspection,    
exam deferred    
MSK:  Full active range of motion in all 4 extremities, atraumatic, no   
tenderness to palpation of any bony prominences    
Skin:  No rashes or lesions noted    
Neuro:  NIH of 0, no focal deficits Alert awake oriented x3, moves all 4   
extremities spontaneously, cranial nerves intact, able to answer all questions   
appropriately follows commands appropriately    
Psych:  Cooperative, negative suicidal or homicidal ideations    
Initial Vital Signs    
Initial Vital Signs:     
    
Vital Signs    
    
    
    
Blood Pressure  144/88 H  25 04:49    
    
    
    
    
    
<Brynn Mcpherson, DO - Last Filed: 25 19:03>    
Initial Vital Signs    
Initial Vital Signs:     
    
Vital Signs    
    
    
    
Blood Pressure  144/88 H  25 04:49    
    
    
    
    
    
Course    
<Jamie Garciabret, DO - Last Filed: 25 07:00>    
Orders    
Ordered:     
    
                                    ED Orders    
    
25 04:57    
XR chest 1V Stat     
EKG-12 Lead Stat     
    
25 05:06    
Complete Blood Count AUTO DIFF Stat     
Comprehensive Metabolic Panel Stat     
Lipase Stat     
NT-proBNP (BNP-Adult 18+) Stat     
PTT Partial Thromboplastin Jonas Stat     
Procalcitonin Stat     
Prothrombin Time INR Stat     
Troponin & CK Cardiac Panel Stat     
    
25 05:11    
Covid-19 + FLU A/B + RSV - PCR Stat     
    
25 05:19    
CT angio head Stat     
    
25 06:40    
Urinalysis and Microscopic Stat     
Urine Drug Screen, Rapid Stat     
    
    
    
    
Vital Signs    
Vital signs:     
    
                               Vital Signs - 8 hr    
    
    
    
 25    
04:49 25    
04:50 25    
04:59    
     
Temperature   97.1 F L    
     
Pulse Rate  59 L 60    
     
Respiratory Rate   16    
     
Blood Pressure 144/88 H  144/88 H    
     
Pulse Oximetry  97 99    
     
Oxygen Delivery Method   Room Air    
    
    
    
    
 25    
05:00 25    
05:00 25    
05:09    
     
Temperature       
     
Pulse Rate 67  72    
     
Respiratory Rate       
     
Blood Pressure  129/85     
     
Pulse Oximetry 96  98    
     
Oxygen Delivery Method       
    
    
    
    
 25    
05:09 25    
05:11 25    
05:11    
     
Temperature       
     
Pulse Rate  75     
     
Respiratory Rate       
     
Blood Pressure 133/91 H  141/82 H    
     
Pulse Oximetry  98     
     
Oxygen Delivery Method       
    
    
    
    
 25    
05:30 25    
05:30 25    
05:46    
     
Temperature       
     
Pulse Rate 62  61    
     
Respiratory Rate 10 L  12    
     
Blood Pressure  121/69     
     
Pulse Oximetry 96  99    
     
Oxygen Delivery Method       
    
    
    
    
 25    
05:46 25    
06:00 25    
06:00    
     
Temperature       
     
Pulse Rate  61     
     
Respiratory Rate  21     
     
Blood Pressure 134/82  135/83    
     
Pulse Oximetry  98     
     
Oxygen Delivery Method       
    
    
    
    
 25    
06:30 25    
06:30 25    
06:41    
     
Temperature       
     
Pulse Rate  74 60    
     
Respiratory Rate  19     
     
Blood Pressure 139/84      
     
Pulse Oximetry  98 100    
     
Oxygen Delivery Method       
    
    
    
    
 25    
06:41 25    
07:00 25    
07:00    
     
Temperature       
     
Pulse Rate  59 L     
     
Respiratory Rate  12     
     
Blood Pressure 137/80  110/75    
     
Pulse Oximetry  93     
     
Oxygen Delivery Method       
    
    
    
    
 25    
07:30 25    
07:30 25    
08:00    
     
Temperature       
     
Pulse Rate 56 L      
     
Respiratory Rate       
     
Blood Pressure  125/74 125/77    
     
Pulse Oximetry 96      
     
Oxygen Delivery Method       
    
    
    
    
 25    
08:00    
     
Temperature     
     
Pulse Rate 59 L    
     
Respiratory Rate 16    
     
Blood Pressure     
     
Pulse Oximetry 98    
     
Oxygen Delivery Method     
    
    
    
    
    
<Brynn Mcpherson,  - Last Filed: 25 19:03>    
Orders    
Ordered:     
    
                                    ED Orders    
    
25 04:57    
XR chest 1V Stat     
EKG-12 Lead Stat     
    
25 05:06    
Complete Blood Count AUTO DIFF Stat     
Comprehensive Metabolic Panel Stat     
Lipase Stat     
NT-proBNP (BNP-Adult 18+) Stat     
PTT Partial Thromboplastin Jonas Stat     
Procalcitonin Stat     
Prothrombin Time INR Stat     
Troponin & CK Cardiac Panel Stat     
    
25 05:11    
Covid-19 + FLU A/B + RSV - PCR Stat     
    
25 05:19    
CT angio head Stat     
    
25 06:40    
Urinalysis and Microscopic Stat     
Urine Drug Screen, Rapid Stat     
    
    
    
    
Vital Signs    
Vital signs:     
    
                               Vital Signs - 8 hr    
    
    
    
 25    
04:49 25    
04:50 25    
04:59    
     
Temperature   97.1 F L    
     
Pulse Rate  59 L 60    
     
Respiratory Rate   16    
     
Blood Pressure 144/88 H  144/88 H    
     
Pulse Oximetry  97 99    
     
Oxygen Delivery Method   Room Air    
    
    
    
    
 25    
05:00 25    
05:00 25    
05:09    
     
Temperature       
     
Pulse Rate 67  72    
     
Respiratory Rate       
     
Blood Pressure  129/85     
     
Pulse Oximetry 96  98    
     
Oxygen Delivery Method       
    
    
    
    
 25    
05:09 25    
05:11 25    
05:11    
     
Temperature       
     
Pulse Rate  75     
     
Respiratory Rate       
     
Blood Pressure 133/91 H  141/82 H    
     
Pulse Oximetry  98     
     
Oxygen Delivery Method       
    
    
    
    
 25    
05:30 25    
05:30 25    
05:46    
     
Temperature       
     
Pulse Rate 62  61    
     
Respiratory Rate 10 L  12    
     
Blood Pressure  121/69     
     
Pulse Oximetry 96  99    
     
Oxygen Delivery Method       
    
    
    
    
 25    
05:46 25    
06:00 25    
06:00    
     
Temperature       
     
Pulse Rate  61     
     
Respiratory Rate  21     
     
Blood Pressure 134/82  135/83    
     
Pulse Oximetry  98     
     
Oxygen Delivery Method       
    
    
    
    
 25    
06:30 25    
06:30 25    
06:41    
     
Temperature       
     
Pulse Rate  74 60    
     
Respiratory Rate  19     
     
Blood Pressure 139/84      
     
Pulse Oximetry  98 100    
     
Oxygen Delivery Method       
    
    
    
    
 25    
06:41 25    
07:00 25    
07:00    
     
Temperature       
     
Pulse Rate  59 L     
     
Respiratory Rate  12     
     
Blood Pressure 137/80  110/75    
     
Pulse Oximetry  93     
     
Oxygen Delivery Method       
    
    
    
    
 25    
07:30 25    
07:30 25    
08:00    
     
Temperature       
     
Pulse Rate 56 L      
     
Respiratory Rate       
     
Blood Pressure  125/74 125/77    
     
Pulse Oximetry 96      
     
Oxygen Delivery Method       
    
    
    
    
 25    
08:00    
     
Temperature     
     
Pulse Rate 59 L    
     
Respiratory Rate 16    
     
Blood Pressure     
     
Pulse Oximetry 98    
     
Oxygen Delivery Method     
    
    
    
    
    
Medical Decision Making    
<Jamie York, DO - Last Filed: 25 07:00>    
Differential Diagnosis    
Differential Diagnosis: CVA, TIA, electrolyte abnormality, electrolyte   
abnormality, COVID, flu    
Lab Data    
    
                                                        25 05:06              
    
                                                        25 05:06              
    
Labs:     
    
                                   Lab Results    
    
    
    
  25 Range/Units    
    
  05:06 05:11 06:40     
     
WBC  6.0    (4.5-11.0)  X10^3/uL    
     
RBC  4.60    (4.5-5.9)  X10^6/uL    
     
Hgb  13.5    (13.5-17.5)  g/dL    
     
Hct  40.0 L    (41-53)  %    
     
MCV  87.0    ()  fL    
     
MCH  29.4    (26-34)  PG    
     
MCHC  33.8    (30-36)  %    
     
RDW  13.6    (11.6-14.8)  %    
     
Plt Count  251    (150-400)  X10^3/uL    
     
Neut % (Auto)  77.9 H    (50-75)  %    
     
Lymph % (Auto)  15.6 L    (25-40)  %    
     
Mono % (Auto)  3.4    (3-14)  %    
     
Eos % (Auto)  2.0    (2-4)  %    
     
Baso % (Auto)  1.1    (0-2)  %    
     
Neut # (Auto)  4700    (6677-3652)  /uL    
     
Lymph # (Auto)  900 L    (8930-8045)  /uL    
     
Mono # (Auto)  200    (0-900)  /uL    
     
Eos # (Auto)  100    (0-450)  /uL    
     
Baso # (Auto)  100    (0-100)  /uL    
     
PT  11.5    (9.4-12.5)  SECONDS    
     
INR  1.0    (0.9-1.3)      
     
APTT  34    (25.1-36.5)  SECONDS    
     
Sodium  136 L    (137-145)  mmol/L    
     
Potassium  4.2    (3.4-5.1)  mmol/L    
     
Chloride  106    ()  mmol/L    
     
Carbon Dioxide  21 L    (22-32)  mmol/L    
     
BUN  15    (9-20)  mg/dL    
     
Creatinine  0.90    (0.66-1.25)  mg/dL    
     
Estimated GFR  > 60    (>60)  mL/min    
     
BUN/Creatinine Ratio  16.7    (6-22)      
     
Glucose  145 H    ()  mg/dL    
     
Calcium  9.7    (8.4-10.2)  mg/dL    
     
Total Bilirubin  0.5    (0.2-1.3)  mg/dL    
     
AST  28    (17-59)  IU/L    
     
ALT  27    (<50)  IU/L    
     
Alkaline Phosphatase  50    ()  U/L    
     
Total Creatine Kinase  113    ()  U/L    
     
Troponin I  < 0.012    (0.01-0.034)  ng/mL    
     
NT-Pro-B Natriuret Pep  224 H    (<125)  pg/mL    
     
Total Protein  7.2    (6.3-8.2)  g/dL    
     
Albumin  4.3    (3.5-5.0)  g/dL    
     
Globulin  2.9    (1.7-4.1)  g/dL    
     
Albumin/Globulin Ratio  1.5    (1.0-2.8)      
     
Lipase  37    ()  U/L    
     
Procalcitonin  0.038    (<0.5)  ng/mL    
     
Urine Color    Yellow      
     
Urine Appearance    Cloudy      
     
Urine pH    8.0  (4.5-8.0)      
     
Ur Specific Gravity    1.010  (1.000-1.035)      
     
Urine Protein    Negative  (Negative)      
     
Urine Glucose (UA)    Negative  (Negative)  g/dL    
     
Urine Ketones    Negative  (NEGATIVE)      
     
Urine Occult Blood    Negative  (Negative)      
     
Urine Nitrate    Negative  (Negative)      
     
Urine Bilirubin    Negative  (NEGATIVE)      
     
Urine Urobilinogen    0.2  (0.2)  E.U./dL    
     
Ur Leukocyte Esterase    Negative  (NEGATIVE)      
     
Urine RBC    None seen  (0-5/HPF)      
     
Urine WBC    None seen  (0-5/HPF)      
     
Ur Squamous Epith Cells    0-1 /hpf  (0-5/HPF)      
     
Amorphous Sediment    3+      
     
Urine Bacteria    None seen  (None)      
     
Ur Culture Indicated?    Cult not indicated      
     
Vol Urine Centrifuged    10ml (spun)      
     
U Opiates 300ng/mL cut    Negative  (Negative)      
     
Ur Oxycodone Screen    Negative  (Negative)      
     
Urine Methadone Screen    Negative  (Negative)      
     
Ur Barbiturates Screen    Negative  (Negative)      
     
U Tricyclic Antidepress    Negative  (Negative)      
     
Ur Phencyclidine Scrn    Negative  (Negative)      
     
Ur Amphetamines Screen    Negative  (Negative)      
     
U Methamphetamines Scrn    Negative  (Negative)      
     
Ur MDMA Scrn (Ecstasy)    Negative  (Negative)      
     
U Benzodiazepines Scrn    Negative  (Negative)      
     
Urine Cocaine Screen    Negative  (Negative)      
     
U Marijuana (THC) Screen    Positive H  (Negative)      
     
Urine Specific Gravity     (Normal)      
     
Ur Creatinine     (Normal)      
     
SARS-CoV-2 (PCR)   Negative   (Negative)      
     
Influenza A (RT-PCR)   Flu a negative   (NEGATIVE)      
     
Influenza B (RT-PCR)   Flu b negative   (NEGATIVE)      
     
RSV (PCR)   Negative   (Negative)      
    
    
    
    
  25 Range/Units    
    
  06:40     
     
WBC   (4.5-11.0)  X10^3/uL    
     
RBC   (4.5-5.9)  X10^6/uL    
     
Hgb   (13.5-17.5)  g/dL    
     
Hct   (41-53)  %    
     
MCV   ()  fL    
     
MCH   (26-34)  PG    
     
MCHC   (30-36)  %    
     
RDW   (11.6-14.8)  %    
     
Plt Count   (150-400)  X10^3/uL    
     
Neut % (Auto)   (50-75)  %    
     
Lymph % (Auto)   (25-40)  %    
     
Mono % (Auto)   (3-14)  %    
     
Eos % (Auto)   (2-4)  %    
     
Baso % (Auto)   (0-2)  %    
     
Neut # (Auto)   (5692-7808)  /uL    
     
Lymph # (Auto)   (6097-6764)  /uL    
     
Mono # (Auto)   (0-900)  /uL    
     
Eos # (Auto)   (0-450)  /uL    
     
Baso # (Auto)   (0-100)  /uL    
     
PT   (9.4-12.5)  SECONDS    
     
INR   (0.9-1.3)      
     
APTT   (25.1-36.5)  SECONDS    
     
Sodium   (137-145)  mmol/L    
     
Potassium   (3.4-5.1)  mmol/L    
     
Chloride   ()  mmol/L    
     
Carbon Dioxide   (22-32)  mmol/L    
     
BUN   (9-20)  mg/dL    
     
Creatinine   (0.66-1.25)  mg/dL    
     
Estimated GFR   (>60)  mL/min    
     
BUN/Creatinine Ratio   (6-22)      
     
Glucose   ()  mg/dL    
     
Calcium   (8.4-10.2)  mg/dL    
     
Total Bilirubin   (0.2-1.3)  mg/dL    
     
AST   (17-59)  IU/L    
     
ALT   (<50)  IU/L    
     
Alkaline Phosphatase   ()  U/L    
     
Total Creatine Kinase   ()  U/L    
     
Troponin I   (0.01-0.034)  ng/mL    
     
NT-Pro-B Natriuret Pep   (<125)  pg/mL    
     
Total Protein   (6.3-8.2)  g/dL    
     
Albumin   (3.5-5.0)  g/dL    
     
Globulin   (1.7-4.1)  g/dL    
     
Albumin/Globulin Ratio   (1.0-2.8)      
     
Lipase   ()  U/L    
     
Procalcitonin   (<0.5)  ng/mL    
     
Urine Color       
     
Urine Appearance       
     
Urine pH  Normal  (4.5-8.0)      
     
Ur Specific Gravity   (1.000-1.035)      
     
Urine Protein   (Negative)      
     
Urine Glucose (UA)   (Negative)  g/dL    
     
Urine Ketones   (NEGATIVE)      
     
Urine Occult Blood   (Negative)      
     
Urine Nitrate   (Negative)      
     
Urine Bilirubin   (NEGATIVE)      
     
Urine Urobilinogen   (0.2)  E.U./dL    
     
Ur Leukocyte Esterase   (NEGATIVE)      
     
Urine RBC   (0-5/HPF)      
     
Urine WBC   (0-5/HPF)      
     
Ur Squamous Epith Cells   (0-5/HPF)      
     
Amorphous Sediment       
     
Urine Bacteria   (None)      
     
Ur Culture Indicated?       
     
Vol Urine Centrifuged       
     
U Opiates 300ng/mL cut   (Negative)      
     
Ur Oxycodone Screen   (Negative)      
     
Urine Methadone Screen   (Negative)      
     
Ur Barbiturates Screen   (Negative)      
     
U Tricyclic Antidepress   (Negative)      
     
Ur Phencyclidine Scrn   (Negative)      
     
Ur Amphetamines Screen   (Negative)      
     
U Methamphetamines Scrn   (Negative)      
     
Ur MDMA Scrn (Ecstasy)   (Negative)      
     
U Benzodiazepines Scrn   (Negative)      
     
Urine Cocaine Screen   (Negative)      
     
U Marijuana (THC) Screen   (Negative)      
     
Urine Specific Gravity  Normal  (Normal)      
     
Ur Creatinine  Normal  (Normal)      
     
SARS-CoV-2 (PCR)   (Negative)      
     
Influenza A (RT-PCR)   (NEGATIVE)      
     
Influenza B (RT-PCR)   (NEGATIVE)      
     
RSV (PCR)   (Negative)      
    
    
    
    
Imaging Data    
Chest x-ray:     
      Radiologist's Impression:     
Preliminary read showing no acute findings, sternotomy wires in place    
CT scan - head:     
      Radiologist's Impression:     
Preliminary read showing non-opacification of proximally the anterior 3rd of the  
superior sagittal sinus (non-opacified sinus has an attenuated appearing) due to  
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  Otherwise   
patent dural venous sinuses and cranial veins.     
ECG Data    
Interpretation:     
EKG interpreted ED physician sinus 74 beats per minute , occasional PVC   
noted, normal axis, nonspecific ST changes no STEMI    
MDM Narrative    
Medical decision making narrative:     
44-year-old male past medical history of aortic root aneurysm status post   
hemiarch replacement, stroke, hypertension, cerebral venous thrombosis,   
diverticulitis, presents to the emergency department from home for evaluation of  
multiple complaints.  He states that he feels similar to when he was diagnosed   
with cerebral venous thrombosis and was transferred to [facility]. He states   
that he is feeling lightheaded, diaphoretic/hot flashes but not having any   
actual chest pain shortness of breath.  He is not having any other symptoms such  
as nausea vomiting abdominal pain.  Patient was seen here by me on 2025   
had an MRV which showed cerebral venous thrombosis which required transfer to   
[facility].  He was also complaining of abdominal pain at that time and was   
found to have diverticulitis.  Today he is not complaining of any abdominal   
pain.  He states his symptoms feel slightly different than previously because he  
is not having the weird taste/smell is whenever he has the sensations of light-  
headedness/hot flashes/confusion.  On exam he has no focal deficits NIH of 0.   
Labwork reviewed, patient without leukocytosis, glucose 145, creatinine 0.90 BUN  
16.7 troponin negative, , procalcitonin 0.038,  Respiratory panel   
negative.  Urinalysis without any signs of acute urinary tract infection, urine   
drug screen positive for marijuana (THC).     
    
Reviewed previous records from his visit here on 2025.  Patient MRV   
results showed occlusion of the superior sagittal sinus as well as occlusion of   
the right sided transverse and sigmoid sinuses.     
    
Did obtain hospital records from [facility] when he was evaluated there on   
2025 for his cerebral venous sinus thrombosis.  Review of records show   
that while patient was in the ED at [place], he had repeat imaging performed  
there, and was evaluated Neurology (Dr. Lam). It was determined with their  
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   
congenital hypoplasia therefore did not require any acute interventions, in   
addition to this patient had repeat CT abdomen of the pelvis which showed   
possible abscess versus mass and was instructed to follow up with   
Gastroenterology as well as possibly Oncology in an outpatient setting.  In   
regards to his sagittal sinus thrombosis he was instructed to continue his full   
dose aspirin and was discharged home on Augmentin for the possible   
diverticulitis.  Patient states that he does have a colonoscopy scheduled in May  
of this year.  Patient states that he has seen a neurologist in the past but   
currently does not see one currently therefore did not schedule any follow up   
with neurologist after his discharge from [facility].      
    
    
    
0700:  Patient was signed out to Dr. Mcpherson, pending official read of CT scan and   
discussion with neurology for further recommendations given patients atypical   
presenting symptoms.     
    
    
    
    
<Brynn Mcpherson, DO - Last Filed: 25 19:03>    
Lab Data    
Labs:     
    
                                   Lab Results    
    
    
    
  25 Range/Units    
    
  05:06 05:11 06:40     
     
WBC  6.0    (4.5-11.0)  X10^3/uL    
     
RBC  4.60    (4.5-5.9)  X10^6/uL    
     
Hgb  13.5    (13.5-17.5)  g/dL    
     
Hct  40.0 L    (41-53)  %    
     
MCV  87.0    ()  fL    
     
MCH  29.4    (26-34)  PG    
     
MCHC  33.8    (30-36)  %    
     
RDW  13.6    (11.6-14.8)  %    
     
Plt Count  251    (150-400)  X10^3/uL    
     
Neut % (Auto)  77.9 H    (50-75)  %    
     
Lymph % (Auto)  15.6 L    (25-40)  %    
     
Mono % (Auto)  3.4    (3-14)  %    
     
Eos % (Auto)  2.0    (2-4)  %    
     
Baso % (Auto)  1.1    (0-2)  %    
     
Neut # (Auto)  4700    (2362-2025)  /uL    
     
Lymph # (Auto)  900 L    (4016-9279)  /uL    
     
Mono # (Auto)  200    (0-900)  /uL    
     
Eos # (Auto)  100    (0-450)  /uL    
     
Baso # (Auto)  100    (0-100)  /uL    
     
PT  11.5    (9.4-12.5)  SECONDS    
     
INR  1.0    (0.9-1.3)      
     
APTT  34    (25.1-36.5)  SECONDS    
     
Sodium  136 L    (137-145)  mmol/L    
     
Potassium  4.2    (3.4-5.1)  mmol/L    
     
Chloride  106    ()  mmol/L    
     
Carbon Dioxide  21 L    (22-32)  mmol/L    
     
BUN  15    (9-20)  mg/dL    
     
Creatinine  0.90    (0.66-1.25)  mg/dL    
     
Estimated GFR  > 60    (>60)  mL/min    
     
BUN/Creatinine Ratio  16.7    (6-22)      
     
Glucose  145 H    ()  mg/dL    
     
Calcium  9.7    (8.4-10.2)  mg/dL    
     
Total Bilirubin  0.5    (0.2-1.3)  mg/dL    
     
AST  28    (17-59)  IU/L    
     
ALT  27    (<50)  IU/L    
     
Alkaline Phosphatase  50    ()  U/L    
     
Total Creatine Kinase  113    ()  U/L    
     
Troponin I  < 0.012    (0.01-0.034)  ng/mL    
     
NT-Pro-B Natriuret Pep  224 H    (<125)  pg/mL    
     
Total Protein  7.2    (6.3-8.2)  g/dL    
     
Albumin  4.3    (3.5-5.0)  g/dL    
     
Globulin  2.9    (1.7-4.1)  g/dL    
     
Albumin/Globulin Ratio  1.5    (1.0-2.8)      
     
Lipase  37    ()  U/L    
     
Procalcitonin  0.038    (<0.5)  ng/mL    
     
Urine Color    Yellow      
     
Urine Appearance    Cloudy      
     
Urine pH    8.0  (4.5-8.0)      
     
Ur Specific Gravity    1.010  (1.000-1.035)      
     
Urine Protein    Negative  (Negative)      
     
Urine Glucose (UA)    Negative  (Negative)  g/dL    
     
Urine Ketones    Negative  (NEGATIVE)      
     
Urine Occult Blood    Negative  (Negative)      
     
Urine Nitrate    Negative  (Negative)      
     
Urine Bilirubin    Negative  (NEGATIVE)      
     
Urine Urobilinogen    0.2  (0.2)  E.U./dL    
     
Ur Leukocyte Esterase    Negative  (NEGATIVE)      
     
Urine RBC    None seen  (0-5/HPF)      
     
Urine WBC    None seen  (0-5/HPF)      
     
Ur Squamous Epith Cells    0-1 /hpf  (0-5/HPF)      
     
Amorphous Sediment    3+      
     
Urine Bacteria    None seen  (None)      
     
Ur Culture Indicated?    Cult not indicated      
     
Vol Urine Centrifuged    10ml (spun)      
     
U Opiates 300ng/mL cut    Negative  (Negative)      
     
Ur Oxycodone Screen    Negative  (Negative)      
     
Urine Methadone Screen    Negative  (Negative)      
     
Ur Barbiturates Screen    Negative  (Negative)      
     
U Tricyclic Antidepress    Negative  (Negative)      
     
Ur Phencyclidine Scrn    Negative  (Negative)      
     
Ur Amphetamines Screen    Negative  (Negative)      
     
U Methamphetamines Scrn    Negative  (Negative)      
     
Ur MDMA Scrn (Ecstasy)    Negative  (Negative)      
     
U Benzodiazepines Scrn    Negative  (Negative)      
     
Urine Cocaine Screen    Negative  (Negative)      
     
U Marijuana (THC) Screen    Positive H  (Negative)      
     
Urine Specific Gravity     (Normal)      
     
Ur Creatinine     (Normal)      
     
SARS-CoV-2 (PCR)   Negative   (Negative)      
     
Influenza A (RT-PCR)   Flu a negative   (NEGATIVE)      
     
Influenza B (RT-PCR)   Flu b negative   (NEGATIVE)      
     
RSV (PCR)   Negative   (Negative)      
    
    
    
    
  25 Range/Units    
    
  06:40     
     
WBC   (4.5-11.0)  X10^3/uL    
     
RBC   (4.5-5.9)  X10^6/uL    
     
Hgb   (13.5-17.5)  g/dL    
     
Hct   (41-53)  %    
     
MCV   ()  fL    
     
MCH   (26-34)  PG    
     
MCHC   (30-36)  %    
     
RDW   (11.6-14.8)  %    
     
Plt Count   (150-400)  X10^3/uL    
     
Neut % (Auto)   (50-75)  %    
     
Lymph % (Auto)   (25-40)  %    
     
Mono % (Auto)   (3-14)  %    
     
Eos % (Auto)   (2-4)  %    
     
Baso % (Auto)   (0-2)  %    
     
Neut # (Auto)   (9527-5371)  /uL    
     
Lymph # (Auto)   (5616-9452)  /uL    
     
Mono # (Auto)   (0-900)  /uL    
     
Eos # (Auto)   (0-450)  /uL    
     
Baso # (Auto)   (0-100)  /uL    
     
PT   (9.4-12.5)  SECONDS    
     
INR   (0.9-1.3)      
     
APTT   (25.1-36.5)  SECONDS    
     
Sodium   (137-145)  mmol/L    
     
Potassium   (3.4-5.1)  mmol/L    
     
Chloride   ()  mmol/L    
     
Carbon Dioxide   (22-32)  mmol/L    
     
BUN   (9-20)  mg/dL    
     
Creatinine   (0.66-1.25)  mg/dL    
     
Estimated GFR   (>60)  mL/min    
     
BUN/Creatinine Ratio   (6-22)      
     
Glucose   ()  mg/dL    
     
Calcium   (8.4-10.2)  mg/dL    
     
Total Bilirubin   (0.2-1.3)  mg/dL    
     
AST   (17-59)  IU/L    
     
ALT   (<50)  IU/L    
     
Alkaline Phosphatase   ()  U/L    
     
Total Creatine Kinase   ()  U/L    
     
Troponin I   (0.01-0.034)  ng/mL    
     
NT-Pro-B Natriuret Pep   (<125)  pg/mL    
     
Total Protein   (6.3-8.2)  g/dL    
     
Albumin   (3.5-5.0)  g/dL    
     
Globulin   (1.7-4.1)  g/dL    
     
Albumin/Globulin Ratio   (1.0-2.8)      
     
Lipase   ()  U/L    
     
Procalcitonin   (<0.5)  ng/mL    
     
Urine Color       
     
Urine Appearance       
     
Urine pH  Normal  (4.5-8.0)      
     
Ur Specific Gravity   (1.000-1.035)      
     
Urine Protein   (Negative)      
     
Urine Glucose (UA)   (Negative)  g/dL    
     
Urine Ketones   (NEGATIVE)      
     
Urine Occult Blood   (Negative)      
     
Urine Nitrate   (Negative)      
     
Urine Bilirubin   (NEGATIVE)      
     
Urine Urobilinogen   (0.2)  E.U./dL    
     
Ur Leukocyte Esterase   (NEGATIVE)      
     
Urine RBC   (0-5/HPF)      
     
Urine WBC   (0-5/HPF)      
     
Ur Squamous Epith Cells   (0-5/HPF)      
     
Amorphous Sediment       
     
Urine Bacteria   (None)      
     
Ur Culture Indicated?       
     
Vol Urine Centrifuged       
     
U Opiates 300ng/mL cut   (Negative)      
     
Ur Oxycodone Screen   (Negative)      
     
Urine Methadone Screen   (Negative)      
     
Ur Barbiturates Screen   (Negative)      
     
U Tricyclic Antidepress   (Negative)      
     
Ur Phencyclidine Scrn   (Negative)      
     
Ur Amphetamines Screen   (Negative)      
     
U Methamphetamines Scrn   (Negative)      
     
Ur MDMA Scrn (Ecstasy)   (Negative)      
     
U Benzodiazepines Scrn   (Negative)      
     
Urine Cocaine Screen   (Negative)      
     
U Marijuana (THC) Screen   (Negative)      
     
Urine Specific Gravity  Normal  (Normal)      
     
Ur Creatinine  Normal  (Normal)      
     
SARS-CoV-2 (PCR)   (Negative)      
     
Influenza A (RT-PCR)   (NEGATIVE)      
     
Influenza B (RT-PCR)   (NEGATIVE)      
     
RSV (PCR)   (Negative)      
    
    
    
    
[facility] Narrative    
Medical decision making narrative:     
44-year-old male past medical history of aortic root aneurysm status post   
hemiarch replacement, stroke, hypertension, cerebral venous thrombosis,   
diverticulitis, presents to the emergency department from home for evaluation of  
multiple complaints.  He states that he feels similar to when he was diagnosed   
with cerebral venous thrombosis and was transferred to [facility]. He states   
that he is feeling lightheaded, diaphoretic/hot flashes but not having any   
actual chest pain shortness of breath.  He is not having any other symptoms such  
as nausea vomiting abdominal pain.  Patient was seen here by me on 2025   
had an MRV which showed cerebral venous thrombosis which required transfer to   
[facility].  He was also complaining of abdominal pain at that time and was   
found to have diverticulitis.  Today he is not complaining of any abdominal   
pain.  He states his symptoms feel slightly different than previously because he  
is not having the weird taste/smell is whenever he has the sensations of light-  
headedness/hot flashes/confusion.  On exam he has no focal deficits NIH of 0.   
Labwork reviewed, patient without leukocytosis, glucose 145, creatinine 0.90 BUN  
16.7 troponin negative, , procalcitonin 0.038,  Respiratory panel   
negative.  Urinalysis without any signs of acute urinary tract infection, urine   
drug screen positive for marijuana (THC).     
    
Reviewed previous records from his visit here on 2025.  Patient MRV   
results showed occlusion of the superior sagittal sinus as well as occlusion of   
the right sided transverse and sigmoid sinuses.     
    
Did obtain hospital records from [facility] when he was evaluated there on   
2025 for his cerebral venous sinus thrombosis.  Review of records show   
that while patient was in the ED at [place], he had repeat imaging performed  
there, and was evaluated Neurology (Dr. Lam). It was determined with their  
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   
congenital hypoplasia therefore did not require any acute interventions, in   
addition to this patient had repeat CT abdomen of the pelvis which showed   
possible abscess versus mass and was instructed to follow up with   
Gastroenterology as well as possibly Oncology in an outpatient setting.  In   
regards to his sagittal sinus thrombosis he was instructed to continue his full   
dose aspirin and was discharged home on Augmentin for the possible   
diverticulitis.  Patient states that he does have a colonoscopy scheduled in May  
of this year.  Patient states that he has seen a neurologist in the past but   
currently does not see one currently therefore did not schedule any follow up   
with neurologist after his discharge from [facility].      
    
    
    
0700:  Patient was signed out to Dr. Mcpherson, pending official read of CT scan and   
discussion with neurology for further recommendations given patients atypical   
presenting symptoms.     
    
3/31/25 Dr. Mcpherson:  patient signed out to myself by Dr. York.   Patient seen   
and evaluated by myself.  Patient was seen here in January had suspected   
cerebral venous thrombosis and transferred to [facility] or they felt MR was not  
over read versus chronic patient did not have any changes at that time and was   
ultimately discharged home.  Patient states he has had similar type symptoms as   
his last visit feeling off sort of a hot flash but denies any like  taste or   
smell changes which was present last time.  He was felt sort of dizzy.  No   
syncope.  No chest pain or shortness of breath.  He states maybe some nausea.    
No  weakness, numbness or tingling in his extremities.   feels like he does not   
move right when it happens it is for a brief period of time and then resolves.    
Patient takes aspirin, losartan, spironolactone, metoprolol, statin he states   
one additional medication.  He states no known drug allergies.  He did have   
aortic root repair and states that he had strokes during that surgery which he   
was told was normal complication.  And then had another stroke a year or 2   
later.  Patient's labs today did not show any major changes.  He was positive   
for marijuana on UDS.  patient had CT head with and without contrast which shows  
non-opacification proximally anterior 3rd of superior sagittal sinus due to age   
indeterminate more likely a chronic than acute sinus thrombosis which is   
consistent with patient's past reported history.  Otherwise patent dural venous   
sinuses and cranial veins.    As patient has had complicated history in the past  
we will consult with Neurology through [facility] where he was seen last.    
    
0748 Spoke with telestroke neurology Dr. Soto,   Reviewed patient's symptoms,   
findings from today CTA imaging and prior history and prior MR.  She will   
reviewed images from today as well as compared to there is and call back.     
0818 Dr. Soto called back, she will reviewed patient was images does look chronic   
at this time based on patient's symptoms and findings patient can follow up with  
Neurology as outpatient does not require any additional changes for   
interventions at this time.  They did note patient can follow up with them if he  
would like but she noted he did see [facility]rology in October maybe able to   
follow up with them sooner.     
    
Discussed with patient.      
    
    
    
    
Discharge Plan    
Departure    
Patient Disposition: Home    
    
Clinical Impression:    
 Cerebral venous sinus thrombosis    
    
    
Activity Restrictions/Additional Instructions:    
I reviewed your imaging and workup today with Neurology at [facility] they note   
that you do appear to have a chronic cerebral venous thrombosis which is seen on  
your prior imaging and without change, it is noted to be in the anterior 3rd of   
the superior sagittal sinus.      
    
They do recommend you continue your home medications including your aspirin   
daily.    
They do recommend you follow up with Neurology, you may require referral from   
primary care so contact is included for this.      
They did note that use Neurology at [facility] in October they maybe able   
to get you in as well.     
    
Please return if you have other new or concerning changes.      
    
Prescriptions:    
No Action    
  sodium,potassium,mag sulfates [Suprep Bowel Prep Kit] 17.5-3.13-1.6 gram recon  
soln     
   See Rx Instructions  PO .COMPLEX Qty: 354 0RF    
   Rx Instructions:    
   Take as directed by Physician     
  aspirin 325 mg tablet     
   325 mg PO DAILY     
    
Referrals:    
Miscellaneous,Doctor, MD [Primary Care Provider] -     
    
Stand Alone Forms:  Patient Portal/API/Survey

## 2025-03-31 NOTE — DI.CT.S_ITS
PROCEDURE:  CT ANGIO HEAD 
  
INDICATIONS:  History of cerebral venous thrombosis 
  
TECHNIQUE:   
Precontrast 4.5 mm thick angled axial sections acquired from the foramen magnum to the  
vertex.   After the administration of intravenous contrast, 1 mm thick sections acquired  
from the skull base to the vertex after an appropriate venous phase delay.  Postcontrast  
4.5 mm thick sections then re-acquired from the foramen magnum to the vertex.   
Maximum-intensity-projection (MIP) reformats were acquired of the venous structures.  For  
radiation dose reduction, the following was used:  automated exposure control, adjustment  
of mA and/or kV according to patient size.   
  
COMPARISON:  None. 
  
FINDINGS:   
Image quality:  Excellent.   
  
Venous structures:  There is lack of on contrast opacification involving most anterior  
3rd of the superior sagittal sinus with attenuated appearance of the vessel concerning  
for age indeterminate sinus thrombosis.  Rest of the superior sagittal sinus is patent.   
The straight, transverse, and sigmoid sinuses appear patent.   
  
CSF spaces:  Ventricles are normal in size and shape.  Basal cisterns are patent.  No  
extra-axial fluid collections.   
  
Brain:  No midline shift.  No intracranial bleeds or masses.  Gray-white matter interface  
appears intact.   
  
Skull and face:  Calvarium and facial bones appear intact, without suspicious lesions.   
  
Sinuses:  Visualized sinuses and mastoids are clear.   
  
IMPRESSION:   
  
1. 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  
the superior sagittal sinus. 
  
2. Rest of the intracranial veins are patent. 
  
3. No CT evidence of acute intracranial pathology.  If indicated, MRI of brain can be  
done for further evaluation. 
  
  
Dictated by: Keith Mao M.D. on 3/31/2025 at 8:07      
Approved by: Keith Mao M.D. on 3/31/2025 at 8:10

## 2025-03-31 NOTE — ED.GENADULT
HPI - General Adult
<Jamie York DO - Last Filed: 25 07:00>
General
Chief complaint: Chest Pain
Stated complaint: infection in colon, confusion and hot flashes
Time Seen by Provider: 25 04:53
Source: patient
Mode of arrival: Ambulatory
History of Present Illness
HPI narrative: 
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 
multiple complaints.  He states that he is having random hot flashes as well as lightheadedness earlier this morning when he awoke.  He denies any actual syncopal symptoms.  Denies any actual chest pain.  Denies any shortness of breath fever chills 
nausea vomiting abdominal pain or any other GI/ symptoms at the time.  He states that he does feel somewhat similar to when he was seen here previously in which he was diagnosed with venous sinus thrombosis which required transfer to [facility].  
On evaluation patient without any focal deficits NIH of 0.  He states that he is feeling the hot flashes and lightheadedness like he did before but states that he is not having the abnormal taste/smell that he was having previously.  He states that 
he also felt brain fog/confusion which has since resolved. He is also not having any actual abdominal pain that he had previously.
Related Data
Home Medications

 Medication  Instructions  Recorded  Confirmed
aspirin 325 mg tablet 325 mg PO DAILY 23

Previous Rx's

 Medication  Instructions  Recorded
sodium,potassium,mag sulfates 17.5 See Rx Instructions PO .COMPLEX 25
gram-3.13 gram-1.6 gram oral soln #354 mL 
(Suprep Bowel Prep Kit)  


Allergies

Allergy/AdvReac Type Severity Reaction Status Date / Time
No Known Drug Allergies Allergy   Verified 25 15:09



Review of Systems
<Jamie York DO - Last Filed: 25 07:00>
Review of Systems
Narrative: 
General:  Positive chills, hot flashes
HEENT:  Denies headache, eye drainage, eye irritation, head trauma, sore throat, voice change
Cardiovascular:  Denies any chest pain, palpitations, tachycardia
Respiratory:  Denies any shortness of breath, cough, wheeze, stridor
GI/:  Denies any abdominal pain, nausea, vomiting, diarrhea, bright red blood per rectum, melanotic stools, urinary frequency, urinary retention, dysuria, hematuria
MSK:  Denies any joint pain, muscle pains, swelling
Skin:  Denies any rashes, lesions, discoloration
Neuro:  Positive lightheadedness, confusion/brain fog, denies headache weakness
Psych:  Denies SI/HI

Patient History
<Jamie York DO - Last Filed: 25 07:00>
Medical History (Updated 25 @ 06:59 by Jamie York DO)

Colon cancer screening
Essential hypertension
Ascending aorta enlargement
Aortic root enlargement
Family history of brain aneurysm
Hyperglycemia


Surgical History (Reviewed 22 @ 08:14 by JONNY Farmer)

No history of previous surgery


Family History (Reviewed 22 @ 08:14 by JONNY Farmer)
Mother
 Hypertension
Grandmother   
 Congestive heart failure
 Stroke
 Aortic dissection
Father   
 Aortic aneurysm rupture
 Aortic dissection
Family/Other
 Aortic dissection


Social History (Updated 25 @ 15:38 by Sarah Miramontes MA)
marital status:   
number of children:  3 
household members:  spouse and children 
lives independently:  Yes 
occupational status:  employed 
Smoking Status:  Former smoker 
alcohol intake:  former 
substance use type:  marijuana 


Smoking Status: Former smoker
tobacco type: smokeless tobacco
alcohol intake frequency: 0-2 drinks per day

Exam
<Jamie York DO - Last Filed: 25 07:00>
Narrative
Exam Narrative: 
General:  Cooperative, well-developed, not in acute distress
HEENT:  Normocephalic, atraumatic, PERRLA, normal sclera, eyelids normal
Neck:  Active full range of motion, atraumatic
Chest:  Normal to inspection, negative crepitus, no overlying erythema ecchymosis
Respiratory:  Normal respiratory effort, not in acute respiratory distress, clear to auscultation bilaterally negative cough, wheeze, tachypnea, rhonchi, rales
Cardiology:  Regular rate rhythm negative gallop, murmur, rubs
GI/:  No tenderness to palpation, soft, non rigid, normal to inspection,  exam deferred
MSK:  Full active range of motion in all 4 extremities, atraumatic, no tenderness to palpation of any bony prominences
Skin:  No rashes or lesions noted
Neuro:  NIH of 0, no focal deficits Alert awake oriented x3, moves all 4 extremities spontaneously, cranial nerves intact, able to answer all questions appropriately follows commands appropriately
Psych:  Cooperative, negative suicidal or homicidal ideations
Initial Vital Signs
Initial Vital Signs: 

Vital Signs

Blood Pressure  144/88 H  25 04:49




<Brynn Mcpherson, DO - Last Filed: 25 19:03>
Initial Vital Signs
Initial Vital Signs: 

Vital Signs

Blood Pressure  144/88 H  25 04:49




Course
<Jamie Garciabret, DO - Last Filed: 25 07:00>
Orders
Ordered: 

ED Orders

25 04:57
XR chest 1V Stat 
EKG-12 Lead Stat 

25 05:06
Complete Blood Count AUTO DIFF Stat 
Comprehensive Metabolic Panel Stat 
Lipase Stat 
NT-proBNP (BNP-Adult 18+) Stat 
PTT Partial Thromboplastin Jonas Stat 
Procalcitonin Stat 
Prothrombin Time INR Stat 
Troponin & CK Cardiac Panel Stat 

25 05:11
Covid-19 + FLU A/B + RSV - PCR Stat 

25 05:19
CT angio head Stat 

25 06:40
Urinalysis and Microscopic Stat 
Urine Drug Screen, Rapid Stat 




Vital Signs
Vital signs: 

Vital Signs - 8 hr

 25
04:49 25
04:50 25
04:59
Temperature   97.1 F L
Pulse Rate  59 L 60
Respiratory Rate   16
Blood Pressure 144/88 H  144/88 H
Pulse Oximetry  97 99
Oxygen Delivery Method   Room Air

 25
05:00 25
05:00 25
05:09
Temperature   
Pulse Rate 67  72
Respiratory Rate   
Blood Pressure  129/85 
Pulse Oximetry 96  98
Oxygen Delivery Method   

 25
05:09 25
05:11 25
05:11
Temperature   
Pulse Rate  75 
Respiratory Rate   
Blood Pressure 133/91 H  141/82 H
Pulse Oximetry  98 
Oxygen Delivery Method   

 25
05:30 25
05:30 25
05:46
Temperature   
Pulse Rate 62  61
Respiratory Rate 10 L  12
Blood Pressure  121/69 
Pulse Oximetry 96  99
Oxygen Delivery Method   

 25
05:46 25
06:00 25
06:00
Temperature   
Pulse Rate  61 
Respiratory Rate  21 
Blood Pressure 134/82  135/83
Pulse Oximetry  98 
Oxygen Delivery Method   

 25
06:30 25
06:30 25
06:41
Temperature   
Pulse Rate  74 60
Respiratory Rate  19 
Blood Pressure 139/84  
Pulse Oximetry  98 100
Oxygen Delivery Method   

 25
06:41 25
07:00 25
07:00
Temperature   
Pulse Rate  59 L 
Respiratory Rate  12 
Blood Pressure 137/80  110/75
Pulse Oximetry  93 
Oxygen Delivery Method   

 25
07:30 25
07:30 25
08:00
Temperature   
Pulse Rate 56 L  
Respiratory Rate   
Blood Pressure  125/74 125/77
Pulse Oximetry 96  
Oxygen Delivery Method   

 25
08:00
Temperature 
Pulse Rate 59 L
Respiratory Rate 16
Blood Pressure 
Pulse Oximetry 98
Oxygen Delivery Method 




<Brynn Mcpherson,  - Last Filed: 25 19:03>
Orders
Ordered: 

ED Orders

25 04:57
XR chest 1V Stat 
EKG-12 Lead Stat 

25 05:06
Complete Blood Count AUTO DIFF Stat 
Comprehensive Metabolic Panel Stat 
Lipase Stat 
NT-proBNP (BNP-Adult 18+) Stat 
PTT Partial Thromboplastin Jonas Stat 
Procalcitonin Stat 
Prothrombin Time INR Stat 
Troponin & CK Cardiac Panel Stat 

25 05:11
Covid-19 + FLU A/B + RSV - PCR Stat 

25 05:19
CT angio head Stat 

25 06:40
Urinalysis and Microscopic Stat 
Urine Drug Screen, Rapid Stat 




Vital Signs
Vital signs: 

Vital Signs - 8 hr

 25
04:49 25
04:50 25
04:59
Temperature   97.1 F L
Pulse Rate  59 L 60
Respiratory Rate   16
Blood Pressure 144/88 H  144/88 H
Pulse Oximetry  97 99
Oxygen Delivery Method   Room Air

 25
05:00 25
05:00 25
05:09
Temperature   
Pulse Rate 67  72
Respiratory Rate   
Blood Pressure  129/85 
Pulse Oximetry 96  98
Oxygen Delivery Method   

 25
05:09 25
05:11 25
05:11
Temperature   
Pulse Rate  75 
Respiratory Rate   
Blood Pressure 133/91 H  141/82 H
Pulse Oximetry  98 
Oxygen Delivery Method   

 25
05:30 25
05:30 25
05:46
Temperature   
Pulse Rate 62  61
Respiratory Rate 10 L  12
Blood Pressure  121/69 
Pulse Oximetry 96  99
Oxygen Delivery Method   

 25
05:46 25
06:00 25
06:00
Temperature   
Pulse Rate  61 
Respiratory Rate  21 
Blood Pressure 134/82  135/83
Pulse Oximetry  98 
Oxygen Delivery Method   

 25
06:30 25
06:30 25
06:41
Temperature   
Pulse Rate  74 60
Respiratory Rate  19 
Blood Pressure 139/84  
Pulse Oximetry  98 100
Oxygen Delivery Method   

 25
06:41 25
07:00 25
07:00
Temperature   
Pulse Rate  59 L 
Respiratory Rate  12 
Blood Pressure 137/80  110/75
Pulse Oximetry  93 
Oxygen Delivery Method   

 25
07:30 25
07:30 25
08:00
Temperature   
Pulse Rate 56 L  
Respiratory Rate   
Blood Pressure  125/74 125/77
Pulse Oximetry 96  
Oxygen Delivery Method   

 25
08:00
Temperature 
Pulse Rate 59 L
Respiratory Rate 16
Blood Pressure 
Pulse Oximetry 98
Oxygen Delivery Method 




Medical Decision Making
<Jamie York, DO - Last Filed: 25 07:00>
Differential Diagnosis
Differential Diagnosis: CVA, TIA, electrolyte abnormality, electrolyte abnormality, COVID, flu
Lab Data

25 05:06          

25 05:06          

Labs: 

Lab Results

  25 Range/Units
  05:06 05:11 06:40 
WBC  6.0    (4.5-11.0)  X10^3/uL
RBC  4.60    (4.5-5.9)  X10^6/uL
Hgb  13.5    (13.5-17.5)  g/dL
Hct  40.0 L    (41-53)  %
MCV  87.0    ()  fL
MCH  29.4    (26-34)  PG
MCHC  33.8    (30-36)  %
RDW  13.6    (11.6-14.8)  %
Plt Count  251    (150-400)  X10^3/uL
Neut % (Auto)  77.9 H    (50-75)  %
Lymph % (Auto)  15.6 L    (25-40)  %
Mono % (Auto)  3.4    (3-14)  %
Eos % (Auto)  2.0    (2-4)  %
Baso % (Auto)  1.1    (0-2)  %
Neut # (Auto)  4700    (3112-6058)  /uL
Lymph # (Auto)  900 L    (5564-7507)  /uL
Mono # (Auto)  200    (0-900)  /uL
Eos # (Auto)  100    (0-450)  /uL
Baso # (Auto)  100    (0-100)  /uL
PT  11.5    (9.4-12.5)  SECONDS
INR  1.0    (0.9-1.3)  
APTT  34    (25.1-36.5)  SECONDS
Sodium  136 L    (137-145)  mmol/L
Potassium  4.2    (3.4-5.1)  mmol/L
Chloride  106    ()  mmol/L
Carbon Dioxide  21 L    (22-32)  mmol/L
BUN  15    (9-20)  mg/dL
Creatinine  0.90    (0.66-1.25)  mg/dL
Estimated GFR  > 60    (>60)  mL/min
BUN/Creatinine Ratio  16.7    (6-22)  
Glucose  145 H    ()  mg/dL
Calcium  9.7    (8.4-10.2)  mg/dL
Total Bilirubin  0.5    (0.2-1.3)  mg/dL
AST  28    (17-59)  IU/L
ALT  27    (<50)  IU/L
Alkaline Phosphatase  50    ()  U/L
Total Creatine Kinase  113    ()  U/L
Troponin I  < 0.012    (0.01-0.034)  ng/mL
NT-Pro-B Natriuret Pep  224 H    (<125)  pg/mL
Total Protein  7.2    (6.3-8.2)  g/dL
Albumin  4.3    (3.5-5.0)  g/dL
Globulin  2.9    (1.7-4.1)  g/dL
Albumin/Globulin Ratio  1.5    (1.0-2.8)  
Lipase  37    ()  U/L
Procalcitonin  0.038    (<0.5)  ng/mL
Urine Color    Yellow  
Urine Appearance    Cloudy  
Urine pH    8.0  (4.5-8.0)  
Ur Specific Gravity    1.010  (1.000-1.035)  
Urine Protein    Negative  (Negative)  
Urine Glucose (UA)    Negative  (Negative)  g/dL
Urine Ketones    Negative  (NEGATIVE)  
Urine Occult Blood    Negative  (Negative)  
Urine Nitrate    Negative  (Negative)  
Urine Bilirubin    Negative  (NEGATIVE)  
Urine Urobilinogen    0.2  (0.2)  E.U./dL
Ur Leukocyte Esterase    Negative  (NEGATIVE)  
Urine RBC    None seen  (0-5/HPF)  
Urine WBC    None seen  (0-5/HPF)  
Ur Squamous Epith Cells    0-1 /hpf  (0-5/HPF)  
Amorphous Sediment    3+  
Urine Bacteria    None seen  (None)  
Ur Culture Indicated?    Cult not indicated  
Vol Urine Centrifuged    10ml (spun)  
U Opiates 300ng/mL cut    Negative  (Negative)  
Ur Oxycodone Screen    Negative  (Negative)  
Urine Methadone Screen    Negative  (Negative)  
Ur Barbiturates Screen    Negative  (Negative)  
U Tricyclic Antidepress    Negative  (Negative)  
Ur Phencyclidine Scrn    Negative  (Negative)  
Ur Amphetamines Screen    Negative  (Negative)  
U Methamphetamines Scrn    Negative  (Negative)  
Ur MDMA Scrn (Ecstasy)    Negative  (Negative)  
U Benzodiazepines Scrn    Negative  (Negative)  
Urine Cocaine Screen    Negative  (Negative)  
U Marijuana (THC) Screen    Positive H  (Negative)  
Urine Specific Gravity     (Normal)  
Ur Creatinine     (Normal)  
SARS-CoV-2 (PCR)   Negative   (Negative)  
Influenza A (RT-PCR)   Flu a negative   (NEGATIVE)  
Influenza B (RT-PCR)   Flu b negative   (NEGATIVE)  
RSV (PCR)   Negative   (Negative)  

  25 Range/Units
  06:40 
WBC   (4.5-11.0)  X10^3/uL
RBC   (4.5-5.9)  X10^6/uL
Hgb   (13.5-17.5)  g/dL
Hct   (41-53)  %
MCV   ()  fL
MCH   (26-34)  PG
MCHC   (30-36)  %
RDW   (11.6-14.8)  %
Plt Count   (150-400)  X10^3/uL
Neut % (Auto)   (50-75)  %
Lymph % (Auto)   (25-40)  %
Mono % (Auto)   (3-14)  %
Eos % (Auto)   (2-4)  %
Baso % (Auto)   (0-2)  %
Neut # (Auto)   (1183-1325)  /uL
Lymph # (Auto)   (9691-1846)  /uL
Mono # (Auto)   (0-900)  /uL
Eos # (Auto)   (0-450)  /uL
Baso # (Auto)   (0-100)  /uL
PT   (9.4-12.5)  SECONDS
INR   (0.9-1.3)  
APTT   (25.1-36.5)  SECONDS
Sodium   (137-145)  mmol/L
Potassium   (3.4-5.1)  mmol/L
Chloride   ()  mmol/L
Carbon Dioxide   (22-32)  mmol/L
BUN   (9-20)  mg/dL
Creatinine   (0.66-1.25)  mg/dL
Estimated GFR   (>60)  mL/min
BUN/Creatinine Ratio   (6-22)  
Glucose   ()  mg/dL
Calcium   (8.4-10.2)  mg/dL
Total Bilirubin   (0.2-1.3)  mg/dL
AST   (17-59)  IU/L
ALT   (<50)  IU/L
Alkaline Phosphatase   ()  U/L
Total Creatine Kinase   ()  U/L
Troponin I   (0.01-0.034)  ng/mL
NT-Pro-B Natriuret Pep   (<125)  pg/mL
Total Protein   (6.3-8.2)  g/dL
Albumin   (3.5-5.0)  g/dL
Globulin   (1.7-4.1)  g/dL
Albumin/Globulin Ratio   (1.0-2.8)  
Lipase   ()  U/L
Procalcitonin   (<0.5)  ng/mL
Urine Color   
Urine Appearance   
Urine pH  Normal  (4.5-8.0)  
Ur Specific Gravity   (1.000-1.035)  
Urine Protein   (Negative)  
Urine Glucose (UA)   (Negative)  g/dL
Urine Ketones   (NEGATIVE)  
Urine Occult Blood   (Negative)  
Urine Nitrate   (Negative)  
Urine Bilirubin   (NEGATIVE)  
Urine Urobilinogen   (0.2)  E.U./dL
Ur Leukocyte Esterase   (NEGATIVE)  
Urine RBC   (0-5/HPF)  
Urine WBC   (0-5/HPF)  
Ur Squamous Epith Cells   (0-5/HPF)  
Amorphous Sediment   
Urine Bacteria   (None)  
Ur Culture Indicated?   
Vol Urine Centrifuged   
U Opiates 300ng/mL cut   (Negative)  
Ur Oxycodone Screen   (Negative)  
Urine Methadone Screen   (Negative)  
Ur Barbiturates Screen   (Negative)  
U Tricyclic Antidepress   (Negative)  
Ur Phencyclidine Scrn   (Negative)  
Ur Amphetamines Screen   (Negative)  
U Methamphetamines Scrn   (Negative)  
Ur MDMA Scrn (Ecstasy)   (Negative)  
U Benzodiazepines Scrn   (Negative)  
Urine Cocaine Screen   (Negative)  
U Marijuana (THC) Screen   (Negative)  
Urine Specific Gravity  Normal  (Normal)  
Ur Creatinine  Normal  (Normal)  
SARS-CoV-2 (PCR)   (Negative)  
Influenza A (RT-PCR)   (NEGATIVE)  
Influenza B (RT-PCR)   (NEGATIVE)  
RSV (PCR)   (Negative)  



Imaging Data
Chest x-ray: 
      Radiologist's Impression: 
Preliminary read showing no acute findings, sternotomy wires in place
CT scan - head: 
      Radiologist's Impression: 
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  Otherwise patent 
dural venous sinuses and cranial veins. 
ECG Data
Interpretation: 
EKG interpreted ED physician sinus 74 beats per minute , occasional PVC noted, normal axis, nonspecific ST changes no STEMI
MDM Narrative
Medical decision making narrative: 
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  
He states that he feels similar to when he was diagnosed with cerebral venous thrombosis and was transferred to [place]. He states that he is feeling lightheaded, diaphoretic/hot flashes but not having any actual chest pain shortness of breath.  
He is not having any other symptoms such as nausea vomiting abdominal pain.  Patient was seen here by me on 2025 had an MRV which showed cerebral venous thrombosis which required transfer to [facility].  He was also complaining of abdominal 
pain at that time and was found to have diverticulitis.  Today he is not complaining of any abdominal pain.  He states his symptoms feel slightly different than previously because he is not having the weird taste/smell is whenever he has the 
sensations of light-headedness/hot flashes/confusion.  On exam he has no focal deficits NIH of 0. Labwork reviewed, patient without leukocytosis, glucose 145, creatinine 0.90 BUN 16.7 troponin negative, , procalcitonin 0.038,  Respiratory 
panel negative.  Urinalysis without any signs of acute urinary tract infection, urine drug screen positive for marijuana (THC). 

Reviewed previous records from his visit here on 2025.  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 

Did obtain hospital records from [place] when he was evaluated there on 2025 for his cerebral venous sinus thrombosis.  Review of records show that while patient was in the ED at [place], he had repeat imaging performed there, and was 
evaluated Neurology (Dr. Lam). 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 
CT abdomen of the pelvis which showed possible abscess versus mass and was instructed to follow up with Gastroenterology as well as possibly Oncology in an outpatient setting.  In regards to his sagittal sinus thrombosis he was instructed to 
continue his full dose aspirin and was discharged home on Augmentin for the possible diverticulitis.  Patient states that he does have a colonoscopy scheduled in May of this year.  Patient states that he has seen a neurologist in the past but 
currently does not see one currently therefore did not schedule any follow up with neurologist after his discharge from [place].  



0700:  Patient was signed out to Dr. Mcpherson, pending official read of CT scan and discussion with neurology for further recommendations given patients atypical presenting symptoms. 




<Brynn Mcpherson, DO - Last Filed: 25 19:03>
Lab Data
Labs: 

Lab Results

  25 Range/Units
  05:06 05:11 06:40 
WBC  6.0    (4.5-11.0)  X10^3/uL
RBC  4.60    (4.5-5.9)  X10^6/uL
Hgb  13.5    (13.5-17.5)  g/dL
Hct  40.0 L    (41-53)  %
MCV  87.0    ()  fL
MCH  29.4    (26-34)  PG
MCHC  33.8    (30-36)  %
RDW  13.6    (11.6-14.8)  %
Plt Count  251    (150-400)  X10^3/uL
Neut % (Auto)  77.9 H    (50-75)  %
Lymph % (Auto)  15.6 L    (25-40)  %
Mono % (Auto)  3.4    (3-14)  %
Eos % (Auto)  2.0    (2-4)  %
Baso % (Auto)  1.1    (0-2)  %
Neut # (Auto)  4700    (7052-6690)  /uL
Lymph # (Auto)  900 L    (6190-1871)  /uL
Mono # (Auto)  200    (0-900)  /uL
Eos # (Auto)  100    (0-450)  /uL
Baso # (Auto)  100    (0-100)  /uL
PT  11.5    (9.4-12.5)  SECONDS
INR  1.0    (0.9-1.3)  
APTT  34    (25.1-36.5)  SECONDS
Sodium  136 L    (137-145)  mmol/L
Potassium  4.2    (3.4-5.1)  mmol/L
Chloride  106    ()  mmol/L
Carbon Dioxide  21 L    (22-32)  mmol/L
BUN  15    (9-20)  mg/dL
Creatinine  0.90    (0.66-1.25)  mg/dL
Estimated GFR  > 60    (>60)  mL/min
BUN/Creatinine Ratio  16.7    (6-22)  
Glucose  145 H    ()  mg/dL
Calcium  9.7    (8.4-10.2)  mg/dL
Total Bilirubin  0.5    (0.2-1.3)  mg/dL
AST  28    (17-59)  IU/L
ALT  27    (<50)  IU/L
Alkaline Phosphatase  50    ()  U/L
Total Creatine Kinase  113    ()  U/L
Troponin I  < 0.012    (0.01-0.034)  ng/mL
NT-Pro-B Natriuret Pep  224 H    (<125)  pg/mL
Total Protein  7.2    (6.3-8.2)  g/dL
Albumin  4.3    (3.5-5.0)  g/dL
Globulin  2.9    (1.7-4.1)  g/dL
Albumin/Globulin Ratio  1.5    (1.0-2.8)  
Lipase  37    ()  U/L
Procalcitonin  0.038    (<0.5)  ng/mL
Urine Color    Yellow  
Urine Appearance    Cloudy  
Urine pH    8.0  (4.5-8.0)  
Ur Specific Gravity    1.010  (1.000-1.035)  
Urine Protein    Negative  (Negative)  
Urine Glucose (UA)    Negative  (Negative)  g/dL
Urine Ketones    Negative  (NEGATIVE)  
Urine Occult Blood    Negative  (Negative)  
Urine Nitrate    Negative  (Negative)  
Urine Bilirubin    Negative  (NEGATIVE)  
Urine Urobilinogen    0.2  (0.2)  E.U./dL
Ur Leukocyte Esterase    Negative  (NEGATIVE)  
Urine RBC    None seen  (0-5/HPF)  
Urine WBC    None seen  (0-5/HPF)  
Ur Squamous Epith Cells    0-1 /hpf  (0-5/HPF)  
Amorphous Sediment    3+  
Urine Bacteria    None seen  (None)  
Ur Culture Indicated?    Cult not indicated  
Vol Urine Centrifuged    10ml (spun)  
U Opiates 300ng/mL cut    Negative  (Negative)  
Ur Oxycodone Screen    Negative  (Negative)  
Urine Methadone Screen    Negative  (Negative)  
Ur Barbiturates Screen    Negative  (Negative)  
U Tricyclic Antidepress    Negative  (Negative)  
Ur Phencyclidine Scrn    Negative  (Negative)  
Ur Amphetamines Screen    Negative  (Negative)  
U Methamphetamines Scrn    Negative  (Negative)  
Ur MDMA Scrn (Ecstasy)    Negative  (Negative)  
U Benzodiazepines Scrn    Negative  (Negative)  
Urine Cocaine Screen    Negative  (Negative)  
U Marijuana (THC) Screen    Positive H  (Negative)  
Urine Specific Gravity     (Normal)  
Ur Creatinine     (Normal)  
SARS-CoV-2 (PCR)   Negative   (Negative)  
Influenza A (RT-PCR)   Flu a negative   (NEGATIVE)  
Influenza B (RT-PCR)   Flu b negative   (NEGATIVE)  
RSV (PCR)   Negative   (Negative)  

  25 Range/Units
  06:40 
WBC   (4.5-11.0)  X10^3/uL
RBC   (4.5-5.9)  X10^6/uL
Hgb   (13.5-17.5)  g/dL
Hct   (41-53)  %
MCV   ()  fL
MCH   (26-34)  PG
MCHC   (30-36)  %
RDW   (11.6-14.8)  %
Plt Count   (150-400)  X10^3/uL
Neut % (Auto)   (50-75)  %
Lymph % (Auto)   (25-40)  %
Mono % (Auto)   (3-14)  %
Eos % (Auto)   (2-4)  %
Baso % (Auto)   (0-2)  %
Neut # (Auto)   (0059-1064)  /uL
Lymph # (Auto)   (8395-4781)  /uL
Mono # (Auto)   (0-900)  /uL
Eos # (Auto)   (0-450)  /uL
Baso # (Auto)   (0-100)  /uL
PT   (9.4-12.5)  SECONDS
INR   (0.9-1.3)  
APTT   (25.1-36.5)  SECONDS
Sodium   (137-145)  mmol/L
Potassium   (3.4-5.1)  mmol/L
Chloride   ()  mmol/L
Carbon Dioxide   (22-32)  mmol/L
BUN   (9-20)  mg/dL
Creatinine   (0.66-1.25)  mg/dL
Estimated GFR   (>60)  mL/min
BUN/Creatinine Ratio   (6-22)  
Glucose   ()  mg/dL
Calcium   (8.4-10.2)  mg/dL
Total Bilirubin   (0.2-1.3)  mg/dL
AST   (17-59)  IU/L
ALT   (<50)  IU/L
Alkaline Phosphatase   ()  U/L
Total Creatine Kinase   ()  U/L
Troponin I   (0.01-0.034)  ng/mL
NT-Pro-B Natriuret Pep   (<125)  pg/mL
Total Protein   (6.3-8.2)  g/dL
Albumin   (3.5-5.0)  g/dL
Globulin   (1.7-4.1)  g/dL
Albumin/Globulin Ratio   (1.0-2.8)  
Lipase   ()  U/L
Procalcitonin   (<0.5)  ng/mL
Urine Color   
Urine Appearance   
Urine pH  Normal  (4.5-8.0)  
Ur Specific Gravity   (1.000-1.035)  
Urine Protein   (Negative)  
Urine Glucose (UA)   (Negative)  g/dL
Urine Ketones   (NEGATIVE)  
Urine Occult Blood   (Negative)  
Urine Nitrate   (Negative)  
Urine Bilirubin   (NEGATIVE)  
Urine Urobilinogen   (0.2)  E.U./dL
Ur Leukocyte Esterase   (NEGATIVE)  
Urine RBC   (0-5/HPF)  
Urine WBC   (0-5/HPF)  
Ur Squamous Epith Cells   (0-5/HPF)  
Amorphous Sediment   
Urine Bacteria   (None)  
Ur Culture Indicated?   
Vol Urine Centrifuged   
U Opiates 300ng/mL cut   (Negative)  
Ur Oxycodone Screen   (Negative)  
Urine Methadone Screen   (Negative)  
Ur Barbiturates Screen   (Negative)  
U Tricyclic Antidepress   (Negative)  
Ur Phencyclidine Scrn   (Negative)  
Ur Amphetamines Screen   (Negative)  
U Methamphetamines Scrn   (Negative)  
Ur MDMA Scrn (Ecstasy)   (Negative)  
U Benzodiazepines Scrn   (Negative)  
Urine Cocaine Screen   (Negative)  
U Marijuana (THC) Screen   (Negative)  
Urine Specific Gravity  Normal  (Normal)  
Ur Creatinine  Normal  (Normal)  
SARS-CoV-2 (PCR)   (Negative)  
Influenza A (RT-PCR)   (NEGATIVE)  
Influenza B (RT-PCR)   (NEGATIVE)  
RSV (PCR)   (Negative)  



[facility] Narrative
Medical decision making narrative: 
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  
He states that he feels similar to when he was diagnosed with cerebral venous thrombosis and was transferred to [place]. He states that he is feeling lightheaded, diaphoretic/hot flashes but not having any actual chest pain shortness of breath.  
He is not having any other symptoms such as nausea vomiting abdominal pain.  Patient was seen here by me on 2025 had an MRV which showed cerebral venous thrombosis which required transfer to [facility].  He was also complaining of abdominal 
pain at that time and was found to have diverticulitis.  Today he is not complaining of any abdominal pain.  He states his symptoms feel slightly different than previously because he is not having the weird taste/smell is whenever he has the 
sensations of light-headedness/hot flashes/confusion.  On exam he has no focal deficits NIH of 0. Labwork reviewed, patient without leukocytosis, glucose 145, creatinine 0.90 BUN 16.7 troponin negative, , procalcitonin 0.038,  Respiratory 
panel negative.  Urinalysis without any signs of acute urinary tract infection, urine drug screen positive for marijuana (THC). 

Reviewed previous records from his visit here on 2025.  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 

Did obtain hospital records from [place] when he was evaluated there on 2025 for his cerebral venous sinus thrombosis.  Review of records show that while patient was in the ED at [place], he had repeat imaging performed there, and was 
evaluated Neurology (Dr. Lam). 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 
CT abdomen of the pelvis which showed possible abscess versus mass and was instructed to follow up with Gastroenterology as well as possibly Oncology in an outpatient setting.  In regards to his sagittal sinus thrombosis he was instructed to 
continue his full dose aspirin and was discharged home on Augmentin for the possible diverticulitis.  Patient states that he does have a colonoscopy scheduled in May of this year.  Patient states that he has seen a neurologist in the past but 
currently does not see one currently therefore did not schedule any follow up with neurologist after his discharge from [place].  



0700:  Patient was signed out to Dr. Mcpherson, pending official read of CT scan and discussion with neurology for further recommendations given patients atypical presenting symptoms. 

3/31/25 Dr. Mcpherson:  patient signed out to myself by Dr. York.   Patient seen and evaluated by myself.  Patient was seen here in January had suspected cerebral venous thrombosis and transferred to [facility] or they felt MR was not over read versus 
chronic patient did not have any changes at that time and was ultimately discharged home.  Patient states he has had similar type symptoms as his last visit feeling off sort of a hot flash but denies any like  taste or smell changes which was 
present last time.  He was felt sort of dizzy.  No syncope.  No chest pain or shortness of breath.  He states maybe some nausea.  No  weakness, numbness or tingling in his extremities.   feels like he does not move right when it happens it is for a 
brief period of time and then resolves.  Patient takes aspirin, losartan, spironolactone, metoprolol, statin he states one additional medication.  He states no known drug allergies.  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 
during that surgery which he was told was normal complication.  And then had another stroke a year or 2 later.  Patient's labs today did not show any major changes.  He was positive for marijuana on UDS.  patient had CT head with and without 
contrast which shows non-opacification proximally anterior 3rd of superior sagittal sinus due to age indeterminate more likely a chronic than acute sinus thrombosis which is consistent with patient's past reported history.  Otherwise patent dural 
venous sinuses and cranial veins.    As patient has had complicated history in the past we will consult with Neurology through [facility] where he was seen last.

0748 Spoke with telestroke neurology Dr. Soto,   Reviewed patient's symptoms, findings from today CTA imaging and prior history and prior MR.  She will reviewed images from today as well as compared to there is and call back. 
0818 Dr. Soto called back, she will reviewed patient was images does look chronic at this time based on patient's symptoms and findings patient can follow up with Neurology as outpatient does not require any additional changes for interventions at 
this time.  They did note patient can follow up with them if he would like but she noted he did see [facility] Neurology in October maybe able to follow up with them sooner. 

Discussed with patient.  




Discharge Plan
Departure
Patient Disposition: Home

Clinical Impression:
 Cerebral venous sinus thrombosis


Activity Restrictions/Additional Instructions:
I reviewed your imaging and workup today with Neurology at [facility] they note that you do appear to have a chronic cerebral venous thrombosis which is seen on your prior imaging and without change, it is noted to be in the anterior 3rd of the 
superior sagittal sinus.  

They do recommend you continue your home medications including your aspirin daily.
They do recommend you follow up with Neurology, you may require referral from primary care so contact is included for this.  
They did note that use Neurology at [facility] in October they maybe able to get you in as well. 

Please return if you have other new or concerning changes.  

Prescriptions:
No Action
  sodium,potassium,mag sulfates [Suprep Bowel Prep Kit] 17.5-3.13-1.6 gram recon soln 
   See Rx Instructions  PO .COMPLEX Qty: 354 0RF
   Rx Instructions:
   Take as directed by Physician 
  aspirin 325 mg tablet 
   325 mg PO DAILY 

Referrals:
Miscellaneous,Doctor, MD [Primary Care Provider] - 

Stand Alone Forms:  Patient Portal/API/Survey

## 2025-03-31 NOTE — PC.NURSE
[facility] Note: Contacted  for consult with Neurology at 2444. Images pushed, imaging reports faxed, demographics faxed. Consult completed with Dr. Mcpherson at 0340.

## 2025-03-31 NOTE — DI.RAD.S_ITS
PROCEDURE:  XR CHEST 1V 
  
INDICATIONS:  lightheaded 
  
TECHNIQUE:  One view of the chest was acquired.   
  
COMPARISON:  [facility], CR, XR CHEST 1V, 1/20/2025, 16:10. 
  
FINDINGS:   
  
Surgical changes and devices:  Median sternotomy wires are seen. 
  
Lungs and pleura:  Lungs are clear.  No pleural effusions or pneumothorax.   
  
Mediastinum:  Mediastinal contours appear normal.  Heart size is normal.   
  
Bones and chest wall:  No suspicious bony lesions.  Overlying soft tissues appear  
unremarkable.   
  
  
IMPRESSION:   
  
No acute cardiopulmonary pathology. 
  
No discrepancies. 
  
  
  
Dictated by: Keith Mao M.D. on 3/31/2025 at 8:06      
Approved by: Keith Mao M.D. on 3/31/2025 at 8:06

## 2025-03-31 NOTE — EKG_ITS
[address] [address] 
                                        
                                       Test Date:    2025 
Pat Name:     Marc Reid        Department:   [facility] 
Patient ID:   [id number]               Room:          
Gender:       Male                     Technician:   JESUS 
:          1980               Requested By:  
Order Number: [id number]              Reading MD:   Boubacar Reid 
                                 Measurements 
Intervals                              Axis           
Rate:         74                       P:            56 
CO:           146                      QRS:          72 
QRSD:         100                      T:            53 
QT:           410                                     
QTc:          455                                     
                           Interpretive Statements 
Sinus rhythm with occasional premature ventricular complexes 
Electronically Signed On 3- 15:49:13 PDT by Boubacar Reid

## 2025-03-31 NOTE — PC.NURSE
While obtaining ekg, pt became diaphoretic, dizzy lightheaded, breathing deeply. states his previous episodes where similar.  blood pressure stayed the same, hr. was in the 70s. previous episode was about an hr prior. states has had 3 episodes this 
morning and 2 yesterday.